# Patient Record
Sex: FEMALE | Race: WHITE | NOT HISPANIC OR LATINO | Employment: FULL TIME | URBAN - METROPOLITAN AREA
[De-identification: names, ages, dates, MRNs, and addresses within clinical notes are randomized per-mention and may not be internally consistent; named-entity substitution may affect disease eponyms.]

---

## 2022-02-07 ENCOUNTER — TRANSFERRED RECORDS (OUTPATIENT)
Dept: HEALTH INFORMATION MANAGEMENT | Facility: CLINIC | Age: 22
End: 2022-02-07

## 2022-03-01 ENCOUNTER — TRANSFERRED RECORDS (OUTPATIENT)
Dept: MULTI SPECIALTY CLINIC | Facility: CLINIC | Age: 22
End: 2022-03-01

## 2022-03-01 LAB — PAP SMEAR - HIM PATIENT REPORTED: NORMAL

## 2023-01-13 ENCOUNTER — TRANSFERRED RECORDS (OUTPATIENT)
Dept: HEALTH INFORMATION MANAGEMENT | Facility: CLINIC | Age: 23
End: 2023-01-13

## 2023-08-06 ENCOUNTER — TRANSFERRED RECORDS (OUTPATIENT)
Dept: HEALTH INFORMATION MANAGEMENT | Facility: HOSPITAL | Age: 23
End: 2023-08-06

## 2023-10-24 ENCOUNTER — TRANSFERRED RECORDS (OUTPATIENT)
Dept: HEALTH INFORMATION MANAGEMENT | Facility: CLINIC | Age: 23
End: 2023-10-24

## 2023-12-19 LAB — PHQ9 SCORE: 22

## 2024-03-27 ENCOUNTER — TRANSFERRED RECORDS (OUTPATIENT)
Dept: HEALTH INFORMATION MANAGEMENT | Facility: CLINIC | Age: 24
End: 2024-03-27

## 2024-05-20 LAB
CREATININE (EXTERNAL): 0.82 MG/DL (ref 0.4–1)
GFR ESTIMATED (EXTERNAL): 103 ML/MIN/1.73M2
GLUCOSE (EXTERNAL): 86 MG/DL (ref 70–99)
POTASSIUM (EXTERNAL): 4.3 MEQ/L (ref 3.4–5.1)
TSH SERPL-ACNC: 3.65 UIU/ML (ref 0.4–3.99)

## 2024-05-24 ENCOUNTER — TRANSFERRED RECORDS (OUTPATIENT)
Dept: HEALTH INFORMATION MANAGEMENT | Facility: HOSPITAL | Age: 24
End: 2024-05-24

## 2024-05-30 LAB — PHQ9 SCORE: 14

## 2024-06-01 ENCOUNTER — TRANSFERRED RECORDS (OUTPATIENT)
Dept: MULTI SPECIALTY CLINIC | Facility: CLINIC | Age: 24
End: 2024-06-01

## 2024-06-01 LAB — CHLAMYDIA - HIM PATIENT REPORTED: NORMAL

## 2024-06-21 ENCOUNTER — TRANSFERRED RECORDS (OUTPATIENT)
Dept: HEALTH INFORMATION MANAGEMENT | Facility: CLINIC | Age: 24
End: 2024-06-21

## 2024-07-24 ENCOUNTER — TRANSFERRED RECORDS (OUTPATIENT)
Dept: HEALTH INFORMATION MANAGEMENT | Facility: HOSPITAL | Age: 24
End: 2024-07-24

## 2024-07-30 ENCOUNTER — TRANSFERRED RECORDS (OUTPATIENT)
Dept: HEALTH INFORMATION MANAGEMENT | Facility: HOSPITAL | Age: 24
End: 2024-07-30

## 2024-07-30 LAB
ALT SERPL-CCNC: 12 IU/L (ref 6–31)
AST SERPL-CCNC: 17 IU/L (ref 10–40)
CREATININE (EXTERNAL): 0.85 MG/DL (ref 0.4–1)
GFR ESTIMATED (EXTERNAL): 99 ML/MIN/1.73M2
GLUCOSE (EXTERNAL): 66 MG/DL (ref 70–99)
INR (EXTERNAL): 1 (ref 0.9–1.1)
POTASSIUM (EXTERNAL): 4.2 MEQ/L (ref 3.4–5.1)
TSH SERPL-ACNC: 1.29 UIU/ML (ref 0.4–3.99)

## 2024-08-07 ENCOUNTER — TRANSFERRED RECORDS (OUTPATIENT)
Dept: HEALTH INFORMATION MANAGEMENT | Facility: CLINIC | Age: 24
End: 2024-08-07

## 2024-08-07 LAB
CREATININE (EXTERNAL): 0.81 MG/DL (ref 0.4–1)
GFR ESTIMATED (EXTERNAL): 105 ML/MIN/1.73M2
GLUCOSE (EXTERNAL): 84 MG/DL (ref 70–99)
POTASSIUM (EXTERNAL): 3.6 MEQ/L (ref 3.4–5.1)

## 2024-08-13 ENCOUNTER — APPOINTMENT (OUTPATIENT)
Dept: OCCUPATIONAL MEDICINE | Facility: OTHER | Age: 24
End: 2024-08-13

## 2024-08-13 ENCOUNTER — OFFICE VISIT (OUTPATIENT)
Dept: FAMILY MEDICINE | Facility: OTHER | Age: 24
End: 2024-08-13
Attending: FAMILY MEDICINE
Payer: COMMERCIAL

## 2024-08-13 VITALS
HEIGHT: 64 IN | OXYGEN SATURATION: 99 % | RESPIRATION RATE: 17 BRPM | DIASTOLIC BLOOD PRESSURE: 70 MMHG | SYSTOLIC BLOOD PRESSURE: 110 MMHG | TEMPERATURE: 98.1 F | BODY MASS INDEX: 26.65 KG/M2 | HEART RATE: 78 BPM | WEIGHT: 156.1 LBS

## 2024-08-13 DIAGNOSIS — R42 DIZZINESS: ICD-10-CM

## 2024-08-13 DIAGNOSIS — G43.909 MIGRAINE WITHOUT STATUS MIGRAINOSUS, NOT INTRACTABLE, UNSPECIFIED MIGRAINE TYPE: Primary | ICD-10-CM

## 2024-08-13 DIAGNOSIS — R20.2 PARESTHESIA: ICD-10-CM

## 2024-08-13 DIAGNOSIS — E16.2 HYPOGLYCEMIA: ICD-10-CM

## 2024-08-13 DIAGNOSIS — F31.81 BIPOLAR II DISORDER, MOST RECENT EPISODE HYPOMANIC (H): ICD-10-CM

## 2024-08-13 PROBLEM — F43.10 PTSD (POST-TRAUMATIC STRESS DISORDER): Status: ACTIVE | Noted: 2023-10-25

## 2024-08-13 PROCEDURE — 99214 OFFICE O/P EST MOD 30 MIN: CPT | Performed by: FAMILY MEDICINE

## 2024-08-13 PROCEDURE — 99000 SPECIMEN HANDLING OFFICE-LAB: CPT

## 2024-08-13 PROCEDURE — G0463 HOSPITAL OUTPT CLINIC VISIT: HCPCS

## 2024-08-13 PROCEDURE — G2211 COMPLEX E/M VISIT ADD ON: HCPCS | Performed by: FAMILY MEDICINE

## 2024-08-13 RX ORDER — LITHIUM CARBONATE 300 MG/1
300 TABLET, FILM COATED, EXTENDED RELEASE ORAL 2 TIMES DAILY
Qty: 180 TABLET | Refills: 3 | Status: SHIPPED | OUTPATIENT
Start: 2024-08-13 | End: 2024-10-02

## 2024-08-13 RX ORDER — FLUTICASONE PROPIONATE 50 MCG
2 SPRAY, SUSPENSION (ML) NASAL DAILY
COMMUNITY

## 2024-08-13 RX ORDER — LITHIUM CARBONATE 300 MG/1
1 TABLET, FILM COATED, EXTENDED RELEASE ORAL 2 TIMES DAILY
COMMUNITY
Start: 2024-06-24 | End: 2024-08-13

## 2024-08-13 RX ORDER — LORATADINE 10 MG/1
10 TABLET ORAL DAILY PRN
COMMUNITY

## 2024-08-13 ASSESSMENT — PAIN SCALES - GENERAL: PAINLEVEL: MODERATE PAIN (4)

## 2024-08-13 NOTE — PROGRESS NOTES
"  Assessment & Plan     Migraine without status migrainosus, not intractable, unspecified migraine type  Not currently on migraine specific medications. ? If the sx she experiences below may be related to migraine disorder. Consider neuro consult    Bipolar II disorder, most recent episode hypomanic (H)  Pt is seen by outside psychiatry group. On low dose lithium. This has been helpful. Will get records.   - lithium ER (LITHOBID) 300 MG CR tablet  Dispense: 180 tablet; Refill: 3  - Lithium level    Paresthesia / Dizziness  Facial numbness, brain fog, etc. Ongoing for quite some time. Pt ties these with low BG levels, however, these sx occur even with fairly normal levels. Update labs below. She reports previous imaging and will bring records.   - TSH with free T4 reflex  - Vitamin B12  - Magnesium  - Anti Nuclear Seema IgG by IFA with Reflex    Hypoglycemia  Reviewed BG readings on pts phone. Lowest in high 60s. Symptoms, however, can occur with BG in the low 100s, etc. While the BG run on the low end, I am not sure the degree of symptoms is explained by her BG levels. These levels appear generally wnl for 24 yo patient. Recommend further work up for the symptoms as above. Will discuss diet further, increase in protein, complex carbs.   - Comprehensive metabolic panel (BMP + Alb, Alk Phos, ALT, AST, Total. Bili, TP)  - Cortisol  - CRP, inflammation  - ESR: Erythrocyte sedimentation rate    BMI  Estimated body mass index is 26.79 kg/m  as calculated from the following:    Height as of this encounter: 1.626 m (5' 4\").    Weight as of this encounter: 70.8 kg (156 lb 1.6 oz).     No follow-ups on file.    May Vences is a 23 year old, presenting for the following health issues:  Follow Up (Symptoms of low blood sugars )        8/13/2024     1:24 PM   Additional Questions   Roomed by Penny Tobin   Accompanied by self         8/13/2024     1:24 PM   Patient Reported Additional Medications   Patient reports taking " the following new medications none       Via the Health Maintenance questionnaire, the patient has reported the following services have been completed , this information has been sent to the abstraction team.    The longitudinal plan of care for the diagnosis(es)/condition(s) as documented were addressed during this visit. Due to the added complexity in care, I will continue to support Vangie in the subsequent management and with ongoing continuity of care.    History of Present Illness       Reason for visit:  New Patient, reccurent symptoms    She eats 0-1 servings of fruits and vegetables daily.She consumes 2 sweetened beverage(s) daily.She exercises with enough effort to increase her heart rate 30 to 60 minutes per day.  She exercises with enough effort to increase her heart rate 4 days per week.   She is taking medications regularly.       Concern - history of hypoglycemia   Onset: since 15 years old, was ok for while and is now having symptoms again   Description: symptoms of low BS, monitoring blood glucose numbers for past 3 weeks (started Aug. 5) lowest 62 highest 125   Intensity: moderate, frustrated because having symptoms of having low BG (feels dizzy and faint especially at work)  Progression of Symptoms:  intermittent  Accompanying Signs & Symptoms: trouble keeping BG regulated, has had heart palpitations, has trouble in warm temperatures, at times feels like passing out       Previous history of similar problem: off and on since teen   Precipitating factors:        Worsened by: working in warm weather and not eating, exercise  Alleviating factors:        Improved by: eating the right foods at the right time and BG is good through the night, starts to tank during the day   Therapies tried and outcome: monitoring blood sugars.     Shaky with < 75    Review of Systems  Constitutional, neuro, ENT, endocrine, pulmonary, cardiac, gastrointestinal, genitourinary, musculoskeletal, integument and psychiatric  "systems are negative, except as otherwise noted.      Objective    /70 (BP Location: Left arm, Patient Position: Sitting, Cuff Size: Adult Regular)   Pulse 78   Temp 98.1  F (36.7  C) (Tympanic)   Resp 17   Ht 1.626 m (5' 4\")   Wt 70.8 kg (156 lb 1.6 oz)   LMP 07/26/2024 (Exact Date)   SpO2 99%   BMI 26.79 kg/m    Body mass index is 26.79 kg/m .    Physical Exam   GENERAL: alert and no distress  EYES: Eyes grossly normal to inspection  NECK: no adenopathy, no asymmetry, masses, or scars  RESP: lungs clear to auscultation - no rales, rhonchi or wheezes  CV: regular rate and rhythm, normal S1 S2, no S3 or S4, no murmur, click or rub, no peripheral edema  ABDOMEN: soft, nontender, no hepatosplenomegaly, no masses and bowel sounds normal  MS: no gross musculoskeletal defects noted, no edema  SKIN: no suspicious lesions or rashes  PSYCH: mentation appears normal, affect normal/bright    No results found for any visits on 08/13/24.      Signed Electronically by: Silva Self MD    "

## 2024-08-13 NOTE — LETTER
8/13/2024          To whom it may concern,       Vangie Granados was seen 8/13/2024 for ongoing physical symptoms that are currently undergoing work up. While there are no recommendations for work restrictions, she may require intermittent absence or break due to symptoms.         Silva Self MD

## 2024-08-14 ENCOUNTER — LAB (OUTPATIENT)
Dept: LAB | Facility: OTHER | Age: 24
End: 2024-08-14
Payer: COMMERCIAL

## 2024-08-14 ENCOUNTER — MYC MEDICAL ADVICE (OUTPATIENT)
Dept: FAMILY MEDICINE | Facility: OTHER | Age: 24
End: 2024-08-14

## 2024-08-14 DIAGNOSIS — F31.81 BIPOLAR II DISORDER, MOST RECENT EPISODE HYPOMANIC (H): ICD-10-CM

## 2024-08-14 DIAGNOSIS — E16.2 HYPOGLYCEMIA: ICD-10-CM

## 2024-08-14 DIAGNOSIS — R42 DIZZINESS: ICD-10-CM

## 2024-08-14 DIAGNOSIS — R20.2 PARESTHESIA: ICD-10-CM

## 2024-08-14 LAB
ALBUMIN SERPL BCG-MCNC: 4 G/DL (ref 3.5–5.2)
ALP SERPL-CCNC: 29 U/L (ref 40–150)
ALT SERPL W P-5'-P-CCNC: 17 U/L (ref 0–50)
ANION GAP SERPL CALCULATED.3IONS-SCNC: 6 MMOL/L (ref 7–15)
AST SERPL W P-5'-P-CCNC: 17 U/L (ref 0–45)
BILIRUB SERPL-MCNC: 0.4 MG/DL
BUN SERPL-MCNC: 10.6 MG/DL (ref 6–20)
CALCIUM SERPL-MCNC: 8.8 MG/DL (ref 8.8–10.4)
CHLORIDE SERPL-SCNC: 107 MMOL/L (ref 98–107)
CORTIS SERPL-MCNC: 33.2 UG/DL
CREAT SERPL-MCNC: 0.82 MG/DL (ref 0.51–0.95)
CRP SERPL-MCNC: 3.62 MG/L
EGFRCR SERPLBLD CKD-EPI 2021: >90 ML/MIN/1.73M2
ERYTHROCYTE [SEDIMENTATION RATE] IN BLOOD BY WESTERGREN METHOD: 6 MM/HR (ref 0–20)
GLUCOSE SERPL-MCNC: 94 MG/DL (ref 70–99)
HCO3 SERPL-SCNC: 25 MMOL/L (ref 22–29)
LITHIUM SERPL-SCNC: 0.4 MMOL/L (ref 0.6–1.2)
MAGNESIUM SERPL-MCNC: 2.1 MG/DL (ref 1.7–2.3)
POTASSIUM SERPL-SCNC: 3.9 MMOL/L (ref 3.4–5.3)
PROT SERPL-MCNC: 6.6 G/DL (ref 6.4–8.3)
SODIUM SERPL-SCNC: 138 MMOL/L (ref 135–145)
TSH SERPL DL<=0.005 MIU/L-ACNC: 4.01 UIU/ML (ref 0.3–4.2)
VIT B12 SERPL-MCNC: 268 PG/ML (ref 232–1245)

## 2024-08-14 PROCEDURE — 86140 C-REACTIVE PROTEIN: CPT | Mod: ZL

## 2024-08-14 PROCEDURE — 85652 RBC SED RATE AUTOMATED: CPT | Mod: ZL

## 2024-08-14 PROCEDURE — 83735 ASSAY OF MAGNESIUM: CPT | Mod: ZL

## 2024-08-14 PROCEDURE — 80178 ASSAY OF LITHIUM: CPT | Mod: ZL

## 2024-08-14 PROCEDURE — 82533 TOTAL CORTISOL: CPT | Mod: ZL

## 2024-08-14 PROCEDURE — 86038 ANTINUCLEAR ANTIBODIES: CPT | Mod: ZL

## 2024-08-14 PROCEDURE — 36415 COLL VENOUS BLD VENIPUNCTURE: CPT | Mod: ZL

## 2024-08-14 PROCEDURE — 80053 COMPREHEN METABOLIC PANEL: CPT | Mod: ZL

## 2024-08-14 PROCEDURE — 82607 VITAMIN B-12: CPT | Mod: ZL

## 2024-08-14 PROCEDURE — 84443 ASSAY THYROID STIM HORMONE: CPT | Mod: ZL

## 2024-08-15 ENCOUNTER — MYC MEDICAL ADVICE (OUTPATIENT)
Dept: FAMILY MEDICINE | Facility: OTHER | Age: 24
End: 2024-08-15

## 2024-08-15 LAB — ANA SER QL IF: NEGATIVE

## 2024-08-16 NOTE — TELEPHONE ENCOUNTER
"8/16/2024 2:13 PM    Pt called back writer spoke with pt. See message below.     Pt advised if needing immediate medical attention, call 911, go to the ER immediately. Pt denies needing immediate medical attention.    Pt reports hx of migraines and body numbness and head pressure. Again, pt reports all sx are on going. Pt requesting to be seen next week. Pt scheduled.     Pt stated \"I was told I was going to have a Head CT\".     Anamaria Alvarez RN    Future Appointments 8/16/2024 - 2/12/2025        Date Visit Type Length Department Provider     8/19/2024 10:30 AM OFFICE VISIT 30 min [facility] Izabel Dumont, APRN CNP    Location Instructions:     From [facility]: Take [address]. Turn left at [address] [facility] [address] [facility]. Turn left at the first stoplight on [address]. At the first stop sign, take a right onto [address]. The upper level parking lot will be on the left. East Entrance Door number 10.   From [place]: Take [address]. Take a right at [address]. At the first stop sign, take a right onto [address]. The upper level parking lot will be on the left. East Entrance Door number 10.   From [place]: Take [address]. Take the [address] ramp towards [place]. Turn left onto [address]. Take a slight right onto [address]. Turn left at the first stoplight on [address]. At the first stop sign, take a right onto [facility]. The upper level parking lot will be on the left. East Entrance Door number 10.              8/20/2024 10:20 AM NEW 20 min PZ Jose Olmedo DC    Location Instructions:     From [facility]: Take [address]. Turn left at [address] [facility] [address]. Turn left at the second stoplight on [address]. . Go past [employer] and take a left into the parking lot and the facility will be on your right.   From [place]: Take [address]. Take the [address] ramp towards [place]. Turn left " onto [address]. Take a slight right onto [address] [facility]. Turn left at the second stoplight on [address]. Go Past [employer] and take a left into the partking lot and the facility will be on your right.&nbsp;   From [place]: Take [address]. Take a right at [address].&nbsp; Go past [employer] and take a left into the parking lot.&nbsp; The facility will be on your right.

## 2024-08-16 NOTE — TELEPHONE ENCOUNTER
8/16/2024 1:21 PM  Second attempt made to contact pt. Pt didn't answer her phone. See my chart message.  Anamaria Alvarez RN     Pt BIBA c/o dizziness, intermittent x 2 weeks.  States she was treated in the hospital 2 weeks ago and told she had PEs.  Placed on blood thinners.  States she has a hx of vertigo but it has been a long time since she had an episode.  Denies cp.  No weakness, loss of sensation noted.

## 2024-08-16 NOTE — TELEPHONE ENCOUNTER
I dont believe a CT of the head was discussed in the plan.     I know were looking to get her previous records and imaging.     If sx are better can discuss at appt next week.

## 2024-08-16 NOTE — TELEPHONE ENCOUNTER
11:47 AM    Reason for Call: Phone Call    Description: Patient reporting numbness over entire body and experiencing migraine symptoms on left side of her head, when she is active. She had to leave work today before her migraine got much worse. Please give her a call back to advise.    Was an appointment offered for this call? No  If yes : Appointment type              Date    Preferred method for responding to this message: Telephone Call  What is your phone number ?  [phone]    If we cannot reach you directly, may we leave a detailed response at the number you provided? Yes    Can this message wait until your PCP/provider returns, if available today? Not applicable    Jamila Oliveros

## 2024-08-19 ENCOUNTER — OFFICE VISIT (OUTPATIENT)
Dept: FAMILY MEDICINE | Facility: OTHER | Age: 24
End: 2024-08-19
Payer: COMMERCIAL

## 2024-08-19 VITALS
WEIGHT: 156 LBS | HEIGHT: 64 IN | DIASTOLIC BLOOD PRESSURE: 68 MMHG | OXYGEN SATURATION: 99 % | TEMPERATURE: 97.8 F | HEART RATE: 64 BPM | SYSTOLIC BLOOD PRESSURE: 114 MMHG | RESPIRATION RATE: 16 BRPM | BODY MASS INDEX: 26.63 KG/M2

## 2024-08-19 DIAGNOSIS — R20.2 PARESTHESIA: ICD-10-CM

## 2024-08-19 DIAGNOSIS — F41.9 ANXIETY: ICD-10-CM

## 2024-08-19 DIAGNOSIS — E53.8 VITAMIN B12 DEFICIENCY (NON ANEMIC): ICD-10-CM

## 2024-08-19 DIAGNOSIS — F31.81 BIPOLAR II DISORDER, MOST RECENT EPISODE HYPOMANIC (H): ICD-10-CM

## 2024-08-19 DIAGNOSIS — G43.909 MIGRAINE WITHOUT STATUS MIGRAINOSUS, NOT INTRACTABLE, UNSPECIFIED MIGRAINE TYPE: Primary | ICD-10-CM

## 2024-08-19 PROCEDURE — G0463 HOSPITAL OUTPT CLINIC VISIT: HCPCS

## 2024-08-19 PROCEDURE — 99214 OFFICE O/P EST MOD 30 MIN: CPT

## 2024-08-19 RX ORDER — LANOLIN ALCOHOL/MO/W.PET/CERES
1000 CREAM (GRAM) TOPICAL DAILY
Qty: 90 TABLET | Refills: 0 | Status: SHIPPED | OUTPATIENT
Start: 2024-08-19

## 2024-08-19 ASSESSMENT — PAIN SCALES - GENERAL: PAINLEVEL: NO PAIN (0)

## 2024-08-19 NOTE — LETTER
August 19, 2024      Vangie Granados  [address]  [place] [address]        To Whom It May Concern:    Vangie Granados  was seen on 8/19.  Please excuse her from work on this date due to scheduled office visit and migraine management.         Sincerely,        DEANDRE Wang CNP

## 2024-08-19 NOTE — PROGRESS NOTES
Assessment & Plan     Migraine without status migrainosus, not intractable, unspecified migraine type  Chronic, now with 10-12 headaches per month. Managing with otc analgesics, tylenol. She does not feel paresthesias overlap with her migraines. See below related to updated head imaging. Not interested in further abortive medications. Consider neurology referral.     Paresthesia/Vitamin B12 deficiency (non anemic)  Patient's main concern today of ongoing paresthesia, history is rather complicated. See mychart messages from 8/14. Present over the past 2 months, at times unilateral however currently noted as bilateral including her head/face/shoulders. Does not appear to coincide with her migraines. Underwent extensive laboratory evaluation at [facility] care appointment with Dr. Self on 8/13. We reviewed this blood work at length today. B12 level is on the low end of normal at 268, recommend starting daily supplement 1000 mcg as this could contribute to her symptoms. Patient also mentions request for head imaging and concern with family history of brain aneurysm (states a brain MRI was obtained at [facility] in [place] in 2022 with a small abnormality that CT follow-up was requested which she has not had done). Do not feel based on the length of her symptoms that acute/urgent imaging is needed. Benign neuro exam in the clinic today. She has no headache, nausea, vomiting, weakness today. Vital signs are stable. We will continue to work on requesting records of her prior imaging and plan to repeat- Ct vs MRI. Will be in touch with patient this week. Can consider updated CBC, obtaining SPEP. See below related to psychiatry referral. Of note, work note requested today and given. No further work restriction given. She has previously been given documentation to rest as needed while on the job related to her current symptoms.   - cyanocobalamin (VITAMIN B-12) 1000 MCG tablet  Dispense: 90 tablet; Refill:  0    Bipolar II disorder, most recent episode hypomanic (H)/Anxiety  Unclear history. Currently on lithium which she has been self titrating. Level obtained this week and low at 0.40. Discussed establishing with psychiatry for ongoing evaluation and management. Referral placed today.  - Adult Mental Health  Referral      No follow-ups on file.    May Vences is a 23 year old, presenting for the following health issues:  Headache        8/19/2024    10:24 AM   Additional Questions   Roomed by Tamela Griffiths       Via the Health Maintenance questionnaire, the patient has reported the following services have been completed -Chlamydia: [facility] 2024-06-01, this information has been sent to the abstraction team.  History of Present Illness       Reason for visit:  New Patient, reccurent symptoms    She eats 0-1 servings of fruits and vegetables daily.She consumes 2 sweetened beverage(s) daily.She exercises with enough effort to increase her heart rate 30 to 60 minutes per day.  She exercises with enough effort to increase her heart rate 4 days per week.   She is taking medications regularly.       Migraine   Since your last clinic visit, how have your headaches changed?  Worsened  How often are you getting headaches or migraines? 10 to 12 a month   Are you able to do normal daily activities when you have a migraine? No  Are you taking rescue/relief medications? (Select all that apply) Tylenol  How helpful is your rescue/relief medication?  The relief is inconsistent  Are you taking any medications to prevent migraines? (Select all that apply)  No  In the past 4 weeks, how often have you gone to urgent care or the emergency room because of your headaches?  1    - Brain fog, numbness, tingling, 2 months straight  - Notes numbness- all of her face/head/neck. Used to be only on left side, now both sides.   - Notes she sometimes has bad days where notes numbness across her whole body  -  "Notes she previously discussed a CT with PCP  - MRI brain about 2 years ago, possible abnormality with follow-up CT, no recommendation on length  - Mother's side of family, brain aneurysm  - History of migraines. Tragus piercing helpful. Missing work 3-4 days a week for a number of years prior. Improved after piercing.   - Notes numbness entire body for whole day. Lying down seems to help. Day following this notes a severe headache (3 days ago, 8/15).  - Took a tylenol 500 mg - helped partially.  Tried to sleep off, stretching with no relief.  The following day it was resolved.  - No current nausea or vomiting. Did have nausea with headache 4 days ago.  - Taking lithium, dose decreased due to brain fog.  - 3-4 out of 10 severity of migraines. Prior to piercing they were much worse.  - Requesting work excuse for today  - also inquiring on recommendation for work restriction moving forward.  - Has note that ok to rest at work for her symptoms.    Review of Systems  Constitutional, neuro, ENT, endocrine, pulmonary, cardiac, gastrointestinal, genitourinary, musculoskeletal, integument and psychiatric systems are negative, except as otherwise noted.      Objective    /68 (BP Location: Left arm, Patient Position: Sitting, Cuff Size: Adult Regular)   Pulse 64   Temp 97.8  F (36.6  C) (Tympanic)   Resp 16   Ht 1.626 m (5' 4\")   Wt 70.8 kg (156 lb)   LMP 07/26/2024 (Exact Date)   SpO2 99%   BMI 26.78 kg/m    Body mass index is 26.78 kg/m .    Physical Exam  Constitutional:       General: She is not in acute distress.     Appearance: She is not ill-appearing.   HENT:      Head: Normocephalic and atraumatic.      Right Ear: Tympanic membrane and ear canal normal.      Left Ear: Tympanic membrane and ear canal normal.      Nose: Nose normal.   Eyes:      Extraocular Movements: Extraocular movements intact.      Pupils: Pupils are equal, round, and reactive to light.   Cardiovascular:      Rate and Rhythm: Normal " rate and regular rhythm.      Pulses: Normal pulses.   Pulmonary:      Effort: Pulmonary effort is normal. No respiratory distress.      Breath sounds: No wheezing, rhonchi or rales.   Skin:     General: Skin is warm and dry.   Neurological:      Mental Status: She is alert.      GCS: GCS eye subscore is 4. GCS verbal subscore is 5. GCS motor subscore is 6.      Cranial Nerves: Cranial nerves 2-12 are intact. No dysarthria or facial asymmetry.      Sensory: Sensation is intact.      Motor: Motor function is intact. No weakness.      Coordination: Coordination is intact. Romberg sign negative. Finger-Nose-Finger Test normal.      Gait: Gait is intact. Gait normal.   Psychiatric:         Attention and Perception: Attention normal.         Mood and Affect: Mood is anxious.         Speech: Speech normal.         Behavior: Behavior is cooperative.         Thought Content: Thought content normal.                Signed Electronically by: DEANDRE Wang CNP

## 2024-08-20 ENCOUNTER — OFFICE VISIT (OUTPATIENT)
Dept: CHIROPRACTIC MEDICINE | Facility: OTHER | Age: 24
End: 2024-08-20
Attending: CHIROPRACTOR
Payer: COMMERCIAL

## 2024-08-20 DIAGNOSIS — M99.01 SEGMENTAL AND SOMATIC DYSFUNCTION OF CERVICAL REGION: Primary | ICD-10-CM

## 2024-08-20 DIAGNOSIS — M99.03 SEGMENTAL AND SOMATIC DYSFUNCTION OF LUMBAR REGION: ICD-10-CM

## 2024-08-20 DIAGNOSIS — M54.2 CERVICALGIA: ICD-10-CM

## 2024-08-20 DIAGNOSIS — M99.02 SEGMENTAL AND SOMATIC DYSFUNCTION OF THORACIC REGION: ICD-10-CM

## 2024-08-20 PROCEDURE — 98941 CHIROPRACT MANJ 3-4 REGIONS: CPT | Mod: AT | Performed by: CHIROPRACTOR

## 2024-08-20 PROCEDURE — 99202 OFFICE O/P NEW SF 15 MIN: CPT | Mod: 25 | Performed by: CHIROPRACTOR

## 2024-08-22 ENCOUNTER — OFFICE VISIT (OUTPATIENT)
Dept: CHIROPRACTIC MEDICINE | Facility: OTHER | Age: 24
End: 2024-08-22
Attending: CHIROPRACTOR
Payer: COMMERCIAL

## 2024-08-22 DIAGNOSIS — M99.03 SEGMENTAL AND SOMATIC DYSFUNCTION OF LUMBAR REGION: Primary | ICD-10-CM

## 2024-08-22 DIAGNOSIS — M99.02 SEGMENTAL AND SOMATIC DYSFUNCTION OF THORACIC REGION: ICD-10-CM

## 2024-08-22 DIAGNOSIS — M99.01 SEGMENTAL AND SOMATIC DYSFUNCTION OF CERVICAL REGION: ICD-10-CM

## 2024-08-22 DIAGNOSIS — M54.50 ACUTE BILATERAL LOW BACK PAIN WITHOUT SCIATICA: ICD-10-CM

## 2024-08-22 PROCEDURE — 98941 CHIROPRACT MANJ 3-4 REGIONS: CPT | Mod: AT | Performed by: CHIROPRACTOR

## 2024-08-27 NOTE — PROGRESS NOTES
Subjective Finding:    Chief compalint: Patient presents with:  Back Pain: With neck pain   , Pain Scale: 5/10, Intensity: sharp, Duration: 1 months, Radiating: bilateral buttock.    Date of injury:     Activities that the pain restricts:   Home/household/hobbies/social activities: Yes.  Work duties: Yes.  Sleep: Yes.  Makes symptoms better: rest.  Makes symptoms worse: activity, cervical extension, and cervical flexion.  Have you seen anyone else for the symptoms? Yes: MD.  Work related: No.  Automobile related injury: No.    Objective and Assessment:    Posture Analysis:   High shoulder: .  Head tilt: .  High iliac crest: .  Head carriage: forward.  Thoracic Kyphosis: neutral.  Lumbar Lordosis: neutral.    Lumbar Range of Motion: extension decreased.  Cervical Range of Motion: left rotation decreased and right rotation decreased.  Thoracic Range of Motion: .  Extremity Range of Motion: .    Palpation:   Traps: sharp pain, referred pain: no    Segmental dysfunction pre-treatment and treatment area: C2, C3, T4, and L5.    Assessment post-treatment:  Cervical: ROM increased.  Thoracic: ROM increased.  Lumbar: ROM increased.    Comments: .      Complicating Factors: .    Procedure(s):  CMT:  34489 Chiropractic manipulative treatment 3-4 regions performed   Cervical: Diversified, See above for level, Supine, Thoracic: Diversified, See above for level, Prone, and Lumbar: Diversified, See above for level, Side posture    Modalities:  None performed this visit    Therapeutic procedures:  None    Plan:  Treatment plan: 2 times 2 weeks  .  Instructed patient: stretch as instructed at visit.  Short term goals: increase ROM.  Long term goals: increase ADL.  Prognosis: excellent.

## 2024-08-29 NOTE — PROGRESS NOTES
Subjective Finding:    Chief compalint: Patient presents with:  Back Pain , Pain Scale: 3/10, Intensity: dull, Duration: 2 weeks, Radiating: no.    Date of injury:     Activities that the pain restricts:   Home/household/hobbies/social activities: Yes.  Work duties: No.  Sleep: No.  Makes symptoms better: rest.  Makes symptoms worse: lumbar flexion and cervical flexion.  Have you seen anyone else for the symptoms? No.  Work related: No.  Automobile related injury: No.    Objective and Assessment:    Posture Analysis:   High shoulder: .  Head tilt: .  High iliac crest: .  Head carriage: neutral.  Thoracic Kyphosis: neutral.  Lumbar Lordosis: neutral.    Lumbar Range of Motion: extension decreased.  Cervical Range of Motion: left rotation decreased and right rotation decreased.  Thoracic Range of Motion: .  Extremity Range of Motion: .    Palpation:   Quad lumb: bilateral, referred pain: no    Segmental dysfunction pre-treatment and treatment area: C2, C4, T3, and L4.    Assessment post-treatment:  Cervical: ROM increased.  Thoracic: ROM increased.  Lumbar: ROM increased.    Comments: .      Complicating Factors: .    Procedure(s):  CMT:  93769 Chiropractic manipulative treatment 3-4 regions performed   Cervical: Diversified, See above for level, Supine, Thoracic: Diversified, See above for level, Prone, and Lumbar: Diversified, See above for level, Side posture    Modalities:  None performed this visit    Therapeutic procedures:  None    Plan:  Treatment plan: PRN.  Instructed patient: stretch as instructed at visit.  Short term goals: increase ROM.  Long term goals: increase ADL.  Prognosis: very good.

## 2024-09-13 ENCOUNTER — MYC MEDICAL ADVICE (OUTPATIENT)
Dept: FAMILY MEDICINE | Facility: OTHER | Age: 24
End: 2024-09-13

## 2024-09-13 NOTE — LETTER
September 16, 2024      Vangie Granados  [address]  [place] [address]        To Whom It May Concern:    Vangie Granados has been seen for complex medical concerns. Her symptoms are ongoing and have precluded her participation in her classes. Until her symptoms are improved and proper maintenance program is found, she will be unable to attend class on a reliable basis. Please contact our office with specific concerns or questions.             Sincerely,        Silva Self MD

## 2024-09-20 ENCOUNTER — OFFICE VISIT (OUTPATIENT)
Dept: OBGYN | Facility: OTHER | Age: 24
End: 2024-09-20
Attending: OBSTETRICS & GYNECOLOGY
Payer: COMMERCIAL

## 2024-09-20 VITALS — HEART RATE: 72 BPM | WEIGHT: 156 LBS | HEIGHT: 64 IN | BODY MASS INDEX: 26.63 KG/M2 | RESPIRATION RATE: 18 BRPM

## 2024-09-20 DIAGNOSIS — Z30.017 NEXPLANON INSERTION: Primary | ICD-10-CM

## 2024-09-20 PROCEDURE — 11981 INSERTION DRUG DLVR IMPLANT: CPT | Performed by: OBSTETRICS & GYNECOLOGY

## 2024-09-20 PROCEDURE — 250N000011 HC RX IP 250 OP 636: Mod: JZ | Performed by: OBSTETRICS & GYNECOLOGY

## 2024-09-20 PROCEDURE — G0463 HOSPITAL OUTPT CLINIC VISIT: HCPCS | Mod: 25

## 2024-09-20 RX ORDER — LIDOCAINE HYDROCHLORIDE AND EPINEPHRINE 10; 10 MG/ML; UG/ML
3 INJECTION, SOLUTION INFILTRATION; PERINEURAL ONCE
Status: COMPLETED | OUTPATIENT
Start: 2024-09-20 | End: 2024-09-20

## 2024-09-20 RX ADMIN — ETONOGESTREL 68 MG: 68 IMPLANT SUBCUTANEOUS at 14:49

## 2024-09-20 RX ADMIN — LIDOCAINE HYDROCHLORIDE AND EPINEPHRINE 3 ML: 10; 10 INJECTION, SOLUTION INFILTRATION; PERINEURAL at 14:49

## 2024-09-20 NOTE — PROGRESS NOTES
"Clinic Visit      Subjective:   Vangie Granados is a 23 year old G0 who presents for Nexplanon placement. She has tried 3 different IUDs in the past, and had success with Enedelia, but they were hard to place and her experience in general wasn't great. She has a friend who has Nexplanon and, despite irregular bleeding, she likes it. Vangie feels that this single hormone option will be good for her cycle, reliable contraception, and her mental health. She is knowledgeable about this device and ready to move forward.    Patient Active Problem List   Diagnosis    Bipolar II disorder, most recent episode hypomanic (H)    Migraine, unspecified, not intractable, without status migrainosus    PTSD (post-traumatic stress disorder)       No past medical history on file.    No past surgical history on file.    Social History     Tobacco Use    Smoking status: Never    Smokeless tobacco: Never   Substance Use Topics    Alcohol use: Not on file       No family history on file.       Allergies   Allergen Reactions    Sertraline Other (See Comments)     Suicidal ideation       Current Outpatient Medications   Medication Sig Dispense Refill    cyanocobalamin (VITAMIN B-12) 1000 MCG tablet Take 1 tablet (1,000 mcg) by mouth daily 90 tablet 0    fluticasone (FLONASE) 50 MCG/ACT nasal spray Spray 2 sprays in nostril daily      loratadine (CLARITIN) 10 MG tablet Take 10 mg by mouth daily as needed for allergies      norgestrel-ethinyl estradiol (LO/OVRAL) 0.3-30 MG-MCG tablet Take 1 tablet by mouth daily      lithium ER (LITHOBID) 300 MG CR tablet Take 1 tablet (300 mg) by mouth 2 times daily 180 tablet 3         Review Of Systems  See HPI      Objective:  Pulse 72   Resp 18   Ht 1.626 m (5' 4\")   Wt 70.8 kg (156 lb)   LMP 07/26/2024 (Exact Date)   BMI 26.78 kg/m      Exam:  Constitutional: NAD    Nexplanon placement:    The patient was counseled about the benefits and risks of Nexplanon including excellent long term " contraception, possible bleeding abnormalities, recommendation for removal in 5 years (sooner for problems or desired pregnancy). Risks including bleeding, infection, pain, bruising were discussed. Her right arm was positioned above her head and her elbow was bent. The insertion site was identified and marked, approximately 10 cm from the elbow in the groove of the bicep. The region was cleaned x 3 with betadine. 2% lidocaine with epinephrine was injected along the insertion path. The device was inserted in the standard fashion without difficulty and easily palpated following insertion. The incision was covered with gauze and a bandaid and wrapped with gauze. The patient tolerated the procedure well.            Assessment/Plan:    1. Nexplanon insertion  Uncomplicated, well tolerated. Precautions shared including infection, bleeding, nerve damage. She use backup contraception for the next couple of weeks and will follow up as needed.  - NEXPLANON INSERTION      Allison West MD  Obstetrics/Gynecology

## 2024-09-27 NOTE — TELEPHONE ENCOUNTER
Writer spoke with patient , she is having difficulty getting records from [facility]. She will discuss with provider at 10/2 appt.  Tamela Griffiths LPN

## 2024-10-02 ENCOUNTER — OFFICE VISIT (OUTPATIENT)
Dept: CHIROPRACTIC MEDICINE | Facility: OTHER | Age: 24
End: 2024-10-02
Attending: CHIROPRACTOR
Payer: COMMERCIAL

## 2024-10-02 ENCOUNTER — TELEPHONE (OUTPATIENT)
Dept: FAMILY MEDICINE | Facility: OTHER | Age: 24
End: 2024-10-02

## 2024-10-02 ENCOUNTER — OFFICE VISIT (OUTPATIENT)
Dept: FAMILY MEDICINE | Facility: OTHER | Age: 24
End: 2024-10-02
Attending: FAMILY MEDICINE
Payer: COMMERCIAL

## 2024-10-02 ENCOUNTER — PATIENT OUTREACH (OUTPATIENT)
Dept: CARE COORDINATION | Facility: OTHER | Age: 24
End: 2024-10-02

## 2024-10-02 VITALS
SYSTOLIC BLOOD PRESSURE: 111 MMHG | RESPIRATION RATE: 18 BRPM | HEIGHT: 64 IN | TEMPERATURE: 98.3 F | BODY MASS INDEX: 27.88 KG/M2 | HEART RATE: 76 BPM | DIASTOLIC BLOOD PRESSURE: 77 MMHG | WEIGHT: 163.3 LBS | OXYGEN SATURATION: 99 %

## 2024-10-02 DIAGNOSIS — G43.E09 CHRONIC MIGRAINE WITH AURA WITHOUT STATUS MIGRAINOSUS, NOT INTRACTABLE: Primary | ICD-10-CM

## 2024-10-02 DIAGNOSIS — M99.03 SEGMENTAL AND SOMATIC DYSFUNCTION OF LUMBAR REGION: ICD-10-CM

## 2024-10-02 DIAGNOSIS — E53.8 VITAMIN B12 DEFICIENCY (NON ANEMIC): ICD-10-CM

## 2024-10-02 DIAGNOSIS — M99.02 SEGMENTAL AND SOMATIC DYSFUNCTION OF THORACIC REGION: ICD-10-CM

## 2024-10-02 DIAGNOSIS — F31.32 BIPOLAR AFFECTIVE DISORDER, CURRENTLY DEPRESSED, MODERATE (H): ICD-10-CM

## 2024-10-02 DIAGNOSIS — R10.9 ABDOMINAL BLOATING WITH CRAMPS: ICD-10-CM

## 2024-10-02 DIAGNOSIS — I67.1 INTRACRANIAL ANEURYSM: ICD-10-CM

## 2024-10-02 DIAGNOSIS — M54.2 CERVICALGIA: ICD-10-CM

## 2024-10-02 DIAGNOSIS — M99.01 SEGMENTAL AND SOMATIC DYSFUNCTION OF CERVICAL REGION: Primary | ICD-10-CM

## 2024-10-02 DIAGNOSIS — R14.0 ABDOMINAL BLOATING WITH CRAMPS: ICD-10-CM

## 2024-10-02 DIAGNOSIS — E03.9 HYPOTHYROIDISM, UNSPECIFIED TYPE: ICD-10-CM

## 2024-10-02 PROBLEM — Z82.49 FAMILY HISTORY OF ANEURYSM OF BLOOD VESSEL OF BRAIN: Status: ACTIVE | Noted: 2022-10-04

## 2024-10-02 PROBLEM — E55.9 VITAMIN D DEFICIENCY: Status: ACTIVE | Noted: 2022-11-15

## 2024-10-02 LAB
ALBUMIN SERPL BCG-MCNC: 4.5 G/DL (ref 3.5–5.2)
ALP SERPL-CCNC: 38 U/L (ref 40–150)
ALT SERPL W P-5'-P-CCNC: 15 U/L (ref 0–50)
AST SERPL W P-5'-P-CCNC: 17 U/L (ref 0–45)
BILIRUB DIRECT SERPL-MCNC: <0.2 MG/DL (ref 0–0.3)
BILIRUB SERPL-MCNC: 0.5 MG/DL
PROT SERPL-MCNC: 7.4 G/DL (ref 6.4–8.3)

## 2024-10-02 PROCEDURE — G0463 HOSPITAL OUTPT CLINIC VISIT: HCPCS

## 2024-10-02 PROCEDURE — 80076 HEPATIC FUNCTION PANEL: CPT | Mod: ZL | Performed by: FAMILY MEDICINE

## 2024-10-02 PROCEDURE — G2211 COMPLEX E/M VISIT ADD ON: HCPCS | Performed by: FAMILY MEDICINE

## 2024-10-02 PROCEDURE — 82607 VITAMIN B-12: CPT | Mod: ZL | Performed by: FAMILY MEDICINE

## 2024-10-02 PROCEDURE — 36415 COLL VENOUS BLD VENIPUNCTURE: CPT | Mod: ZL | Performed by: FAMILY MEDICINE

## 2024-10-02 PROCEDURE — 98941 CHIROPRACT MANJ 3-4 REGIONS: CPT | Mod: AT | Performed by: CHIROPRACTOR

## 2024-10-02 PROCEDURE — 99215 OFFICE O/P EST HI 40 MIN: CPT | Performed by: FAMILY MEDICINE

## 2024-10-02 ASSESSMENT — PATIENT HEALTH QUESTIONNAIRE - PHQ9
SUM OF ALL RESPONSES TO PHQ QUESTIONS 1-9: 24
SUM OF ALL RESPONSES TO PHQ QUESTIONS 1-9: 24
10. IF YOU CHECKED OFF ANY PROBLEMS, HOW DIFFICULT HAVE THESE PROBLEMS MADE IT FOR YOU TO DO YOUR WORK, TAKE CARE OF THINGS AT HOME, OR GET ALONG WITH OTHER PEOPLE: EXTREMELY DIFFICULT

## 2024-10-02 ASSESSMENT — ANXIETY QUESTIONNAIRES
6. BECOMING EASILY ANNOYED OR IRRITABLE: MORE THAN HALF THE DAYS
8. IF YOU CHECKED OFF ANY PROBLEMS, HOW DIFFICULT HAVE THESE MADE IT FOR YOU TO DO YOUR WORK, TAKE CARE OF THINGS AT HOME, OR GET ALONG WITH OTHER PEOPLE?: EXTREMELY DIFFICULT
1. FEELING NERVOUS, ANXIOUS, OR ON EDGE: MORE THAN HALF THE DAYS
5. BEING SO RESTLESS THAT IT IS HARD TO SIT STILL: MORE THAN HALF THE DAYS
GAD7 TOTAL SCORE: 18
4. TROUBLE RELAXING: NEARLY EVERY DAY
IF YOU CHECKED OFF ANY PROBLEMS ON THIS QUESTIONNAIRE, HOW DIFFICULT HAVE THESE PROBLEMS MADE IT FOR YOU TO DO YOUR WORK, TAKE CARE OF THINGS AT HOME, OR GET ALONG WITH OTHER PEOPLE: EXTREMELY DIFFICULT
2. NOT BEING ABLE TO STOP OR CONTROL WORRYING: NEARLY EVERY DAY
7. FEELING AFRAID AS IF SOMETHING AWFUL MIGHT HAPPEN: NEARLY EVERY DAY
3. WORRYING TOO MUCH ABOUT DIFFERENT THINGS: NEARLY EVERY DAY
7. FEELING AFRAID AS IF SOMETHING AWFUL MIGHT HAPPEN: NEARLY EVERY DAY
GAD7 TOTAL SCORE: 18
GAD7 TOTAL SCORE: 18
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
## 2024-10-02 NOTE — TELEPHONE ENCOUNTER
Retail Pharmacy Prior Authorization Team   Phone: [phone]    PRIOR AUTHORIZATION DENIED    Medication: NURTEC 75 MG PO TBDP  Insurance Company: [employer] [place] - Phone [phone] Fax [phone]  Denial Date: 10/2/2024  Denial Reason(s): MUST TRY/FAIL UBRELVY        Appeal Information: IF THE PROVIDER WOULD LIKE TO APPEAL THIS DECISION PLEASE PROVIDE THE PA TEAM WITH A LETTER OF MEDICAL NECESSITY      Patient Notified: NO

## 2024-10-02 NOTE — PROGRESS NOTES
Assessment & Plan     Chronic migraine with aura without status migrainosus, not intractable  See neuro notes below from 2022. Did have good luck with nurtec in the past. Will prescribe this for abortive treatment. She may also use excedrin for prn use if needed. Suspect she will also need preventative medication. Previous med trials reviewed. Did well on aimovig injections. We can consider adding this at follow up.   - rimegepant (NURTEC) 75 MG ODT tablet  Dispense: 30 tablet; Refill: 0    Vitamin B12 deficiency (non anemic)  Has started oral supplement, recheck for absoption   - Vitamin B12    Hypothyroidism, unspecified type  Stable on recent labs.     Intracranial aneurysm  Was able to obtain MRI results. Pt does have 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 Will update MR for stability. Discussed given the size, I doubt this is contributing to her current migraines.   - MR Brain w/o & w Contrast  - MRA Brain ([facility]) wo Contrast    Bipolar affective disorder, currently depressed, moderate (H) / ADHD  Previous diagnosis. Did fairly well on lithium, but stopped due to side effects she felt it was causing. She is also unclear if she truly has bipolar disorder. Based on previous conversations with her, does sound like there may have been some markus/hypomania, but current mood is depressed. Pt has done some research interested in strattera or qelbree. Unclear if qelbree is covered, but this would be the preference given the serotonin effect and her depression currently. Otherwise consider effexor (migraines as well) or wellbutrin. Does have psychiatry follow up scheduled.     Abdominal bloating with cramps  Associated with food, usually coupled with diarrhea.   - Hepatic panel (Albumin, ALT, AST, Bili, Alk Phos, TP)  - US Abdomen Limited  - Hepatic panel (Albumin, ALT, AST, Bili, Alk Phos, TP)    BMI  Estimated body mass index is 28.03 kg/m  as calculated from the following:     "Height as of this encounter: 1.626 m (5' 4\").    Weight as of this encounter: 74.1 kg (163 lb 4.8 oz).     Return in about 1 month (around 11/2/2024).    May Vences is a 23 year old, presenting for the following health issues:  Anxiety, Headache, and Depression        10/2/2024     8:57 AM   Additional Questions   Roomed by nicolás badillo   Accompanied by friend         10/2/2024     8:57 AM   Patient Reported Additional Medications   Patient reports taking the following new medications none     History of Present Illness       Reason for visit:  Migraines, numbness, stomach issues related. Meds for mental health. Stomach issues check.   She is taking medications regularly.     pt would like to go back on medications-states SNRIs work for her    Depression and Anxiety   How are you doing with your depression since your last visit? Worsened   How are you doing with your anxiety since your last visit?  Worsened   Are you having other symptoms that might be associated with depression or anxiety? Yes:  suicidal thoughts  Have you had a significant life event? Job Concerns and Health Concerns   Do you have any concerns with your use of alcohol or other drugs? No    Social History     Tobacco Use    Smoking status: Never    Smokeless tobacco: Never   Vaping Use    Vaping status: Never Used   Substance Use Topics    Alcohol use: Yes     Comment: Two beers or one mixed liquor drink every other week.    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         10/2/2024     8:53 AM   PHQ   PHQ-9 Total Score 24   Q9: Thoughts of better off dead/self-harm past 2 weeks More than half the days   F/U: Thoughts of suicide or self-harm Yes   F/U: Self harm-plan No   F/U: Self-harm action No   F/U: Safety concerns Yes         10/2/2024     8:54 AM   OSCAR-7 SCORE   Total Score 18 (severe anxiety)   Total Score 18       Migraine   Since your last clinic visit, how have your " headaches changed?  Worsened  How often are you getting headaches or migraines? daily   Are you able to do normal daily activities when you have a migraine? Yes  Are you taking rescue/relief medications? (Select all that apply) Other: asprin  How helpful is your rescue/relief medication?  I get only a small amount of relief  Are you taking any medications to prevent migraines? (Select all that apply)  No  In the past 4 weeks, how often have you gone to urgent care or the emergency room because of your headaches?  0      Concern - Abdominal Pain  Onset: chronic  Description: states is not able to eat some food as they give her extreme abdominal cramping,rich foods, greasy foods  Intensity: severe  Progression of Symptoms:  worsening  Accompanying Signs & Symptoms: seems to corollate with migraines, diarrhea  Previous history of similar problem: yes  Precipitating factors:        Worsened by: rich foods, greasy foods  Alleviating factors:        Improved by: none  Therapies tried and outcome: tums- but does not help      10/04/2022 text/html HPI Notes: Purnima Granados is a ambidextrous 21 Female who is here today for headaches. He/She denies chest pain, SOB, focal weakness, LOS, LOC, speech disturbance, and visual disturbance,since last exam. previously followed by Dr. Mayer who is retiring. He started her on topiramate 7/7/22 which is working but causing side effects of fatigue and paresthesias in face and L side of body. Prior to starting topomax had L paresthesia as well but was diagnosed with B12. She has not had any labs for at least three years and has not had any imaging. Last headache: 2 mos ago Location of headache: behind both eyes. and neck photo/phonophobia: she has these with migraine and does have these without pain once or twice monthly Radiation:from neck to behind eyes Character of headache: begins achy and then sets in as sharp visual disturbance: none with HA. Frequency: since starting less than  one monthly. Prior to topomax 3wkly Duration of headache:a day long at least may go 3-4 days nausea:none Aggravating factors (physical activity/cough/bending over/ stress/ sleep/weather/foods/etoh): sunlight, loud noises, dehydration Relieving factors: physical activity , massage and pressure to neck. she has knot on L side that really helps when pressed. sleep: getting plenty of hours but has been exhausting last few weeks. depression/anxiety: mild anxiety current treatments:topomax 50mg HS. could not tolerate during day. failed treatments: sumatriptan, topiramate RR for severe side effects but is efficacious for migraine imaging hx:none Norman Pineda MD  [address], [place], [address], [facility] 10/04/2022 10:54:37   11/07/2022 text/html HPI Notes: Purnima Granados is a ambidextrous 21 Female who is here today for headaches. He/She denies chest pain, SOB, focal weakness, LOS, LOC, speech disturbance, and visual disturbance,since last exam. previously followed by Dr. Mayer who is retiring. He started her on topiramate 7/7/22 which is working but causing side effects of fatigue and paresthesias in face and L side of body. Prior to starting topomax had L paresthesia as well but was diagnosed with B12. She has not had any labs for at least three years and has not had any imaging. Last headache: 2 mos ago Location of headache: behind both eyes. and neck photo/phonophobia: she has these with migraine and does have these without pain once or twice monthly Radiation:from neck to behind eyes Character of headache: begins achy and then sets in as sharp visual disturbance: none with HA. Frequency: since starting less than one monthly. Prior to topomax 3wkly Duration of headache:a day long at least may go 3-4 days nausea:none Aggravating factors (physical activity/cough/bending over/ stress/ sleep/weather/foods/etoh): sunlight, loud noises, dehydration Relieving  factors: physical activity , massage and pressure to neck. she has knot on L side that really helps when pressed. sleep: getting plenty of hours but has been exhausting last few weeks. depression/anxiety: mild anxiety current treatments:topomax 50mg HS. could not tolerate during day. failed treatments: sumatriptan, topiramate RR for severe side effects but is efficacious for migraine imaging hx:none 11/7/22: Pt started trokendi last visit which was not helpful. She was given nurtec abortive which was very helpful. She would like to discuss alternative preventative. review of symptoms from last visit remain unchanged. She was also able to get MRI brain and MRA head and neck as ordered. Norman Pineda MD  [address], [place], [address], [facility] 11/07/2022 14:55:58   12/08/2022 text/html HPI Notes: Purnima Granados is a ambidextrous 21 Female who is here today for headaches. He/She denies chest pain, SOB, focal weakness, LOS, LOC, speech disturbance, and visual disturbance,since last exam. previously followed by Dr. Mayer who is retiring. He started her on topiramate 7/7/22 which is working but causing side effects of fatigue and paresthesias in face and L side of body. Prior to starting topomax had L paresthesia as well but was diagnosed with B12. She has not had any labs for at least three years and has not had any imaging. Last headache: 2 mos ago Location of headache: behind both eyes. and neck photo/phonophobia: she has these with migraine and does have these without pain once or twice monthly Radiation:from neck to behind eyes Character of headache: begins achy and then sets in as sharp visual disturbance: none with HA. Frequency: since starting less than one monthly. Prior to topomax 3wkly Duration of headache:a day long at least may go 3-4 days nausea:none Aggravating factors (physical activity/cough/bending over/ stress/ sleep/weather/foods/etoh):  "sunlight, loud noises, dehydration Relieving factors: physical activity , massage and pressure to neck. she has knot on L side that really helps when pressed. sleep: getting plenty of hours but has been exhausting last few weeks. depression/anxiety: mild anxiety current treatments:topomax 50mg HS. could not tolerate during day. failed treatments: sumatriptan, topiramate RR for severe side effects but is efficacious for migraine imaging hx:none 11/7/22: Pt started trokendi last visit which was not helpful. She was given nurtec abortive which was very helpful. She would like to discuss alternative preventative. review of symptoms from last visit remain unchanged. She was also able to get MRI brain and MRA head and neck as ordered. 12/7/22: Pt has started aimovig and is now only having 3HA per month only required nurtec twice. She has not gotten referral from [facility] for cardiology. She has not heard from [facility] [place] for small aneurysm either.          MRA, Head, w/o con, 10/14/22:  There may be a 1x2mm aneurysm involving the P1 segment of the right posterior cerebral artery.      MRA, Neck, w/o con, 10/14/22:  Intact carotid and vertebral artery systems of the neck.      MRI, Brain, w/o con, 10/14/22:  The MRI examination of the brain is unremarkable.         Review of Systems  Constitutional, neuro, ENT, endocrine, pulmonary, cardiac, gastrointestinal, genitourinary, musculoskeletal, integument and psychiatric systems are negative, except as otherwise noted.      Objective    /77 (BP Location: Left arm, Patient Position: Sitting, Cuff Size: Adult Regular)   Pulse 76   Temp 98.3  F (36.8  C) (Tympanic)   Resp 18   Ht 1.626 m (5' 4\")   Wt 74.1 kg (163 lb 4.8 oz)   LMP 07/26/2024 (Exact Date)   SpO2 99%   BMI 28.03 kg/m    Body mass index is 28.03 kg/m .  Physical Exam   GENERAL: alert and no distress  NECK: no adenopathy, no asymmetry, masses, or scars  RESP: lungs clear to auscultation - no rales, " rhonchi or wheezes  CV: regular rates and rhythm, normal S1 S2, no S3 or S4, no murmur, click or rub, and no peripheral edema  ABDOMEN: soft, nontender, no hepatosplenomegaly, no masses and bowel sounds normal  MS: no gross musculoskeletal defects noted, no edema  SKIN: no suspicious lesions or rashes  NEURO: Normal strength and tone, mentation intact and speech normal    Results for orders placed or performed in visit on 10/02/24   Vitamin B12     Status: Normal   Result Value Ref Range    Vitamin B12 539 232 - 1,245 pg/mL   Hepatic panel (Albumin, ALT, AST, Bili, Alk Phos, TP)     Status: Abnormal   Result Value Ref Range    Protein Total 7.4 6.4 - 8.3 g/dL    Albumin 4.5 3.5 - 5.2 g/dL    Bilirubin Total 0.5 <=1.2 mg/dL    Alkaline Phosphatase 38 (L) 40 - 150 U/L    AST 17 0 - 45 U/L    ALT 15 0 - 50 U/L    Bilirubin Direct <0.20 0.00 - 0.30 mg/dL           Signed Electronically by: Silva Self MD

## 2024-10-03 ENCOUNTER — HOSPITAL ENCOUNTER (OUTPATIENT)
Dept: ULTRASOUND IMAGING | Facility: HOSPITAL | Age: 24
Discharge: HOME | End: 2024-10-03
Attending: FAMILY MEDICINE | Admitting: FAMILY MEDICINE
Payer: COMMERCIAL

## 2024-10-03 ENCOUNTER — TELEPHONE (OUTPATIENT)
Dept: FAMILY MEDICINE | Facility: OTHER | Age: 24
End: 2024-10-03

## 2024-10-03 DIAGNOSIS — R10.9 ABDOMINAL BLOATING WITH CRAMPS: ICD-10-CM

## 2024-10-03 DIAGNOSIS — R14.0 ABDOMINAL BLOATING WITH CRAMPS: ICD-10-CM

## 2024-10-03 DIAGNOSIS — F90.0 ATTENTION DEFICIT HYPERACTIVITY DISORDER (ADHD), PREDOMINANTLY INATTENTIVE TYPE: ICD-10-CM

## 2024-10-03 DIAGNOSIS — F31.32 BIPOLAR AFFECTIVE DISORDER, CURRENTLY DEPRESSED, MODERATE (H): Primary | ICD-10-CM

## 2024-10-03 LAB — VIT B12 SERPL-MCNC: 539 PG/ML (ref 232–1245)

## 2024-10-03 PROCEDURE — 76705 ECHO EXAM OF ABDOMEN: CPT

## 2024-10-03 NOTE — TELEPHONE ENCOUNTER
Central Prior Authorization Team  Phone: [phone]    PRIOR AUTHORIZATION DENIED    Medication: QELBREE 100 MG PO CP24  Insurance Company: [employer] - Phone [phone] Fax [phone]  Denial Date: 10/2/2024  Denial Reason(s):         Appeal Information:         Patient Notified: NO- Unfortunately, we cannot call the patient with denials because we do not know what next steps the MD will take nor can we give medical advice, please notify the patient of what they are to expect for the continuation of their therapy from the provider.

## 2024-10-04 ENCOUNTER — MYC MEDICAL ADVICE (OUTPATIENT)
Dept: FAMILY MEDICINE | Facility: OTHER | Age: 24
End: 2024-10-04

## 2024-10-04 ENCOUNTER — TELEPHONE (OUTPATIENT)
Dept: FAMILY MEDICINE | Facility: OTHER | Age: 24
End: 2024-10-04

## 2024-10-04 ENCOUNTER — TELEPHONE (OUTPATIENT)
Dept: CARE COORDINATION | Facility: OTHER | Age: 24
End: 2024-10-04

## 2024-10-04 DIAGNOSIS — G43.E09 CHRONIC MIGRAINE WITH AURA WITHOUT STATUS MIGRAINOSUS, NOT INTRACTABLE: Primary | ICD-10-CM

## 2024-10-04 NOTE — TELEPHONE ENCOUNTER
Patient called and left message on Triage line asking for all of her medications to be switched to generic and her insurance company does not cover name brand. RN spoke with [place]'s pharmacy who said all but 2 of the medications were generic and we are just waiting on the prior Auth which we should here back from on Monday. RN called patient back but she did not answer the phone. VM message left informing her we should here back Monday but if she want to switch to something else she could call me back. Contact number left on .

## 2024-10-04 NOTE — TELEPHONE ENCOUNTER
Received PA request from [facility] for viloxazine ER (QELBREE) 100 MG CP24 capsule. Submitted on Cmm, waiting for response.

## 2024-10-04 NOTE — TELEPHONE ENCOUNTER
Received PA request from [facility] for rimegepant (NURTEC) 75 MG ODT tablet. Submitted on CMM, waiting for response.

## 2024-10-06 ENCOUNTER — HEALTH MAINTENANCE LETTER (OUTPATIENT)
Age: 24
End: 2024-10-06

## 2024-10-07 ENCOUNTER — TELEPHONE (OUTPATIENT)
Dept: FAMILY MEDICINE | Facility: OTHER | Age: 24
End: 2024-10-07

## 2024-10-07 ENCOUNTER — TELEPHONE (OUTPATIENT)
Dept: CARE COORDINATION | Facility: OTHER | Age: 24
End: 2024-10-07

## 2024-10-07 ENCOUNTER — PATIENT OUTREACH (OUTPATIENT)
Dept: CARE COORDINATION | Facility: OTHER | Age: 24
End: 2024-10-07

## 2024-10-07 RX ORDER — VENLAFAXINE HYDROCHLORIDE 75 MG/1
75 CAPSULE, EXTENDED RELEASE ORAL DAILY
Qty: 30 CAPSULE | Refills: 3 | Status: SHIPPED | OUTPATIENT
Start: 2024-10-07 | End: 2024-10-09

## 2024-10-07 NOTE — TELEPHONE ENCOUNTER
Received PA DENIAL from [facility] for rimegepant (NURTEC) 75 MG ODT tablet. Scanned into EPIC, Routed to provider.  Reasoning: Must meet one of the following:

## 2024-10-07 NOTE — TELEPHONE ENCOUNTER
Patient left  message on Triage line requesting different medication due to financial hardship. rimegepant (NURTEC) 75 MG ODT tablet and viloxazine ER (QELBREE) 100 MG CP24 capsule  PRIOR auth denied.    RN to send to PCP to advise.

## 2024-10-07 NOTE — TELEPHONE ENCOUNTER
Writer called number provided, person states pt will not be using this phone for communication.  Tamela Griffiths LPN

## 2024-10-07 NOTE — Clinical Note
RN CC called patient to let her know that alternative medications were ordered by provider and sent to [facility] pharmacy.  RN advised patient to called with any other needs or concerns.

## 2024-10-07 NOTE — TELEPHONE ENCOUNTER
Received PA DENIAL for viloxazine ER (QELBREE) 100 MG CP24 capsule.   Reasoning: Must try and fail formularies listed below. Routed to provider, scanned into EPIC.

## 2024-10-07 NOTE — TELEPHONE ENCOUNTER
Care Team 3   /    Dr. Self    Patient requesting call on TUES 10/8/24 to speak with nurse.  Did not leave details.  Phone number to use is  [phone] (friend Macho's phone) as hers is not activated.      Thank you

## 2024-10-08 ENCOUNTER — TELEPHONE (OUTPATIENT)
Dept: FAMILY MEDICINE | Facility: OTHER | Age: 24
End: 2024-10-08

## 2024-10-08 ASSESSMENT — COLUMBIA-SUICIDE SEVERITY RATING SCALE - C-SSRS
6. IN YOUR LIFETIME, HAVE YOU EVER DONE ANYTHING, STARTED TO DO ANYTHING, OR PREPARED TO DO ANYTHING TO END YOUR LIFE?: NO
2. HAVE YOU ACTUALLY HAD ANY THOUGHTS OF KILLING YOURSELF?: NO
1. IN THE PAST MONTH, HAVE YOU WISHED YOU WERE DEAD OR WISHED YOU COULD GO TO SLEEP AND NOT WAKE UP?: YES

## 2024-10-08 ASSESSMENT — PATIENT HEALTH QUESTIONNAIRE - PHQ9
10. IF YOU CHECKED OFF ANY PROBLEMS, HOW DIFFICULT HAVE THESE PROBLEMS MADE IT FOR YOU TO DO YOUR WORK, TAKE CARE OF THINGS AT HOME, OR GET ALONG WITH OTHER PEOPLE: EXTREMELY DIFFICULT
SUM OF ALL RESPONSES TO PHQ QUESTIONS 1-9: 22
SUM OF ALL RESPONSES TO PHQ QUESTIONS 1-9: 22

## 2024-10-08 NOTE — PROGRESS NOTES
Subjective Finding:    Chief compalint: Patient presents with:  Back Pain: With neck pain   , Pain Scale: 3/10, Intensity: sharp, Duration: 1 weeks, Radiating: bilateral buttock.    Date of injury:     Activities that the pain restricts:   Home/household/hobbies/social activities: Yes.  Work duties: Yes.  Sleep: No.  Makes symptoms better: rest.  Makes symptoms worse: lumbar flexion and cervical flexion.  Have you seen anyone else for the symptoms? No.  Work related: No.  Automobile related injury: No.    Objective and Assessment:    Posture Analysis:   High shoulder: .  Head tilt: .  High iliac crest: .  Head carriage: neutral.  Thoracic Kyphosis: neutral.  Lumbar Lordosis: forward.    Lumbar Range of Motion: extension decreased.  Cervical Range of Motion: .  Thoracic Range of Motion: .  Extremity Range of Motion: .    Palpation:   Quad lumb: bilateral, referred pain: no  Lev scapulae: dull pain, referred pain: no    Segmental dysfunction pre-treatment and treatment area: C2, T6, T7, L4, and L5.    Assessment post-treatment:  Cervical: ROM increased.  Thoracic: ROM increased.  Lumbar: ROM increased.    Comments: .      Complicating Factors: .    Procedure(s):  CMT:  33385 Chiropractic manipulative treatment 3-4 regions performed   Cervical: Diversified, See above for level, Supine, Thoracic: Diversified, See above for level, Prone, and Lumbar: Diversified, See above for level, Side posture    Modalities:  None performed this visit    Therapeutic procedures:  None    Plan:  Treatment plan: PRN.  Instructed patient: stretch as instructed at visit.  Short term goals: reduce pain.  Long term goals: increase ADL.  Prognosis: excellent.

## 2024-10-08 NOTE — TELEPHONE ENCOUNTER
Retail Pharmacy Prior Authorization Team   Phone: [phone]    PRIOR AUTHORIZATION DENIED    DRUG HAS BEEN APPROVED WITHIN QTY LIMITS UNTIL 10/8/2025    Medication: UBRELVY 50 MG PO TABS  Insurance Company: [employer] [place] - Phone [phone] Fax [phone]  Denial Date: 10/8/2024  Denial Reason(s): QTY REQUESTED EXCEEDS PLAN LIMITS      Appeal Information: IF THE PROVIDER WOULD LIKE TO APPEAL THIS DECISION PLEASE PROVIDE THE PA TEAM WITH A LETTER OF MEDICAL NECESSITY      Patient Notified: NO

## 2024-10-09 ENCOUNTER — OFFICE VISIT (OUTPATIENT)
Dept: PSYCHIATRY | Facility: OTHER | Age: 24
End: 2024-10-09
Payer: COMMERCIAL

## 2024-10-09 VITALS
OXYGEN SATURATION: 99 % | DIASTOLIC BLOOD PRESSURE: 76 MMHG | TEMPERATURE: 98 F | WEIGHT: 165 LBS | BODY MASS INDEX: 28.17 KG/M2 | HEIGHT: 64 IN | SYSTOLIC BLOOD PRESSURE: 114 MMHG | HEART RATE: 82 BPM

## 2024-10-09 DIAGNOSIS — F42.9 OBSESSIVE-COMPULSIVE DISORDER, UNSPECIFIED TYPE: ICD-10-CM

## 2024-10-09 DIAGNOSIS — F41.1 GAD (GENERALIZED ANXIETY DISORDER): ICD-10-CM

## 2024-10-09 DIAGNOSIS — F42.4 EXCORIATION (SKIN-PICKING) DISORDER: ICD-10-CM

## 2024-10-09 DIAGNOSIS — F51.01 PRIMARY INSOMNIA: Primary | ICD-10-CM

## 2024-10-09 DIAGNOSIS — F33.0 MAJOR DEPRESSIVE DISORDER, RECURRENT EPISODE, MILD (H): ICD-10-CM

## 2024-10-09 PROCEDURE — G0463 HOSPITAL OUTPT CLINIC VISIT: HCPCS

## 2024-10-09 PROCEDURE — 99205 OFFICE O/P NEW HI 60 MIN: CPT

## 2024-10-09 RX ORDER — VENLAFAXINE HYDROCHLORIDE 37.5 MG/1
37.5 CAPSULE, EXTENDED RELEASE ORAL DAILY
Qty: 30 CAPSULE | Refills: 1 | Status: SHIPPED | OUTPATIENT
Start: 2024-10-09

## 2024-10-09 ASSESSMENT — PAIN SCALES - GENERAL: PAINLEVEL: MODERATE PAIN (5)

## 2024-10-09 NOTE — PATIENT INSTRUCTIONS
- Reduce Effexor XR to 37.5 mg daily  - For the future may consider the following for OCD: fluvoxamine (Luvox) or vilazodone (Viibryd)  - Follow up in 1 month  Thank you for allowing Lori Flores DNP, APRN to participate in your care.  If you have a scheduling or an appointment question please contact our scheduling department at their direct line [phone].   ALL nursing questions or concerns can be directed to the psychiatry nurses at [phone] or [phone]
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
## 2024-10-11 ENCOUNTER — PATIENT OUTREACH (OUTPATIENT)
Dept: CARE COORDINATION | Facility: OTHER | Age: 24
End: 2024-10-11

## 2024-10-11 NOTE — PROGRESS NOTES
Clinic Care Coordination Contact  Care Team Conversations    RN CC called to check in with patient but she was not available. RN to call again next Monday 10/14/2024.    Kimberly Boecker, RONY  Care Coordination

## 2024-10-11 NOTE — Clinical Note
RN CC called to check in with patient but she was not available. RN to call again next Monday 10/14/2024.

## 2024-10-14 ENCOUNTER — HOSPITAL ENCOUNTER (OUTPATIENT)
Dept: MRI IMAGING | Facility: HOSPITAL | Age: 24
Discharge: HOME | End: 2024-10-14
Attending: FAMILY MEDICINE | Admitting: FAMILY MEDICINE
Payer: COMMERCIAL

## 2024-10-14 DIAGNOSIS — I67.1 INTRACRANIAL ANEURYSM: ICD-10-CM

## 2024-10-14 PROCEDURE — 70544 MR ANGIOGRAPHY HEAD W/O DYE: CPT

## 2024-10-14 PROCEDURE — 255N000002 HC RX 255 OP 636: Performed by: RADIOLOGY

## 2024-10-14 PROCEDURE — 70553 MRI BRAIN STEM W/O & W/DYE: CPT

## 2024-10-14 PROCEDURE — A9585 GADOBUTROL INJECTION: HCPCS | Performed by: RADIOLOGY

## 2024-10-14 RX ORDER — GADOBUTROL 604.72 MG/ML
7.5 INJECTION INTRAVENOUS ONCE
Status: COMPLETED | OUTPATIENT
Start: 2024-10-14 | End: 2024-10-14

## 2024-10-14 RX ADMIN — GADOBUTROL 7.5 ML: 604.72 INJECTION INTRAVENOUS at 18:14

## 2024-10-21 ENCOUNTER — OFFICE VISIT (OUTPATIENT)
Dept: CHIROPRACTIC MEDICINE | Facility: OTHER | Age: 24
End: 2024-10-21
Payer: COMMERCIAL

## 2024-10-21 DIAGNOSIS — M99.01 SEGMENTAL AND SOMATIC DYSFUNCTION OF CERVICAL REGION: Primary | ICD-10-CM

## 2024-10-21 DIAGNOSIS — M99.02 SEGMENTAL AND SOMATIC DYSFUNCTION OF THORACIC REGION: ICD-10-CM

## 2024-10-21 DIAGNOSIS — M99.03 SEGMENTAL AND SOMATIC DYSFUNCTION OF LUMBAR REGION: ICD-10-CM

## 2024-10-21 DIAGNOSIS — M54.2 CERVICALGIA: ICD-10-CM

## 2024-10-21 PROCEDURE — 98941 CHIROPRACT MANJ 3-4 REGIONS: CPT | Mod: AT | Performed by: CHIROPRACTOR

## 2024-10-21 NOTE — PROGRESS NOTES
Subjective Finding:    Chief compalint: Patient presents with:  Neck Pain: With back pain   , Pain Scale: 4/10, Intensity: dull and ache, Duration: 2 days, Radiating: no.    Date of injury:     Activities that the pain restricts:   Home/household/hobbies/social activities: Yes.  Work duties: No.  Sleep: No.  Makes symptoms better: rest.  Makes symptoms worse: lumbar flexion and cervical flexion.  Have you seen anyone else for the symptoms? No.  Work related: No.  Automobile related injury: No.    Objective and Assessment:    Posture Analysis:   High shoulder: .  Head tilt: .  High iliac crest: .  Head carriage: neutral.  Thoracic Kyphosis: neutral.  Lumbar Lordosis: neutral.    Lumbar Range of Motion: flexion decreased and extension decreased.  Cervical Range of Motion: left rotation decreased and right rotation decreased.  Thoracic Range of Motion: .  Extremity Range of Motion: .    Palpation:   Sub-occiput: stiff, referred pain: no    Segmental dysfunction pre-treatment and treatment area: C4, T2, T3, L4, and L5.    Assessment post-treatment:  Cervical: ROM increased.  Thoracic: ROM increased.  Lumbar: ROM increased.    Comments: .      Complicating Factors: .    Procedure(s):  CMT:  57905 Chiropractic manipulative treatment 3-4 regions performed   Cervical: Diversified, See above for level, Supine, Thoracic: Diversified, See above for level, Prone, and Lumbar: Diversified, See above for level, Side posture    Modalities:  None performed this visit    Therapeutic procedures:  None    Plan:  Treatment plan: PRN.  Instructed patient: stretch as instructed at visit.  Short term goals: reduce pain.  Long term goals: increase ADL.  Prognosis: very good.

## 2024-10-26 ENCOUNTER — MYC MEDICAL ADVICE (OUTPATIENT)
Dept: FAMILY MEDICINE | Facility: OTHER | Age: 24
End: 2024-10-26

## 2024-10-29 ENCOUNTER — MYC MEDICAL ADVICE (OUTPATIENT)
Dept: PSYCHIATRY | Facility: OTHER | Age: 24
End: 2024-10-29

## 2024-10-29 DIAGNOSIS — G43.E09 CHRONIC MIGRAINE WITH AURA WITHOUT STATUS MIGRAINOSUS, NOT INTRACTABLE: ICD-10-CM

## 2024-10-29 NOTE — TELEPHONE ENCOUNTER
Patient would like to try 100 mg of medication as discussed in [facility]. Pended if wish.  Tamela Griffiths LPN

## 2024-10-29 NOTE — TELEPHONE ENCOUNTER
Would she like to increase the dose to 100mg?    We are limited to 1600 mg in a month and less than 200mg in a 24 hour period. So we have some room.     Silva Self MD

## 2024-11-04 NOTE — PROGRESS NOTES
"  Assessment & Plan     Migraine without status migrainosus, not intractable, unspecified migraine type  Has had good response with ubrelvy for abortive treatment, although not complete. Will increase dose per previous X2TVt messaging. Will also initiate preventative, pt has tolerated aimovig in the past, will order this, if covered.   - erenumab-aooe (AIMOVIG) 70 MG/ML injection  Dispense: 3 mL; Refill: 3    Vitamin D deficiency  Patient requesting testing, is taking supplement, but not consistently.   - Vitamin D Deficiency  - Vitamin D Deficiency    Abdominal bloating / Food intolerance in adult  Pt notes fairly rapid onset of GI distress with certain foods. Mostly acidic or heavy/diary related to foods. Has not had improvement with lactaid use or elim diet. Recommend food allergy testing, consider GI referral.   - Adult ENT  Referral    - Tissue transglutaminase sabrina IgA and IgG  - Tissue transglutaminase sabrina IgA and IgG    BMI  Estimated body mass index is 29.08 kg/m  as calculated from the following:    Height as of this encounter: 1.626 m (5' 4\").    Weight as of this encounter: 76.8 kg (169 lb 6.4 oz).     No follow-ups on file.    May Vences is a 23 year old, presenting for the following health issues:  Headache        11/5/2024    12:58 PM   Additional Questions   Roomed by Chitra Jimenez   Accompanied by self     History of Present Illness       Headaches:   Since the patient's last clinic visit, headaches are: improved  The patient is getting headaches:  Every other day  She is not able to do normal daily activities when she has a migraine.  The patient is taking the following rescue/relief medications:  Other   Patient states \"The relief is inconsistent\" from the rescue/relief medications.   The patient is taking the following medications to prevent migraines:  No medications to prevent migraines  In the past 4 weeks, the patient has gone to an Urgent Care or Emergency Room 0 times " "times due to headaches.    Reason for visit:  Follow up, back pt referral, acupuncture refferal    She eats 2-3 servings of fruits and vegetables daily.She consumes 1 sweetened beverage(s) daily.She exercises with enough effort to increase her heart rate 10 to 19 minutes per day.  She exercises with enough effort to increase her heart rate 3 or less days per week.   She is taking medications regularly.       Migraine   Since your last clinic visit, how have your headaches changed?  Improved  How often are you getting headaches or migraines? Every other day   Are you able to do normal daily activities when you have a migraine? No  Are you taking rescue/relief medications? (Select all that apply) Other: Ubrevly  How helpful is your rescue/relief medication?  The relief is inconsistent  Are you taking any medications to prevent migraines? (Select all that apply)  No  In the past 4 weeks, how often have you gone to urgent care or the emergency room because of your headaches?  0    Review of Systems  Constitutional, neuro, ENT, endocrine, pulmonary, cardiac, gastrointestinal, genitourinary, musculoskeletal, integument and psychiatric systems are negative, except as otherwise noted.      Objective    /70   Pulse 92   Temp 98.9  F (37.2  C) (Tympanic)   Resp 16   Ht 1.626 m (5' 4\")   Wt 76.8 kg (169 lb 6.4 oz)   SpO2 99%   BMI 29.08 kg/m    Body mass index is 29.08 kg/m .  Physical Exam   GENERAL: alert and no distress  EYES: Eyes grossly normal to inspection  NECK: no adenopathy, no asymmetry, masses, or scars  RESP: lungs clear to auscultation - no rales, rhonchi or wheezes  CV: regular rates and rhythm, normal S1 S2, no S3 or S4, no murmur, click or rub, and no peripheral edema  ABDOMEN: soft, nontender, no hepatosplenomegaly, no masses and bowel sounds normal  MS: no gross musculoskeletal defects noted, no edema  SKIN: no suspicious lesions or rashes  NEURO: Normal strength and tone, mentation intact, and " speech normal  PSYCH: mentation appears normal, affect normal/bright    Results for orders placed or performed in visit on 11/05/24   Vitamin D Deficiency     Status: Normal   Result Value Ref Range    Vitamin D, Total (25-Hydroxy) 21 20 - 50 ng/mL    Narrative    Season, race, dietary intake, and treatment affect the concentration of 25-hydroxy-Vitamin D. Values may decrease during winter months and increase during summer months.    Vitamin D determination is routinely performed by an immunoassay specific for 25 hydroxyvitamin D3.  If an individual is on vitamin D2(ergocalciferol) supplementation, please specify 25 OH vitamin D2 and D3 level determination by LCMSMS test VITD23.           Signed Electronically by: Silva Self MD

## 2024-11-05 ENCOUNTER — OFFICE VISIT (OUTPATIENT)
Dept: FAMILY MEDICINE | Facility: OTHER | Age: 24
End: 2024-11-05
Attending: FAMILY MEDICINE
Payer: COMMERCIAL

## 2024-11-05 VITALS
WEIGHT: 169.4 LBS | HEART RATE: 92 BPM | HEIGHT: 64 IN | DIASTOLIC BLOOD PRESSURE: 70 MMHG | TEMPERATURE: 98.9 F | RESPIRATION RATE: 16 BRPM | SYSTOLIC BLOOD PRESSURE: 100 MMHG | BODY MASS INDEX: 28.92 KG/M2 | OXYGEN SATURATION: 99 %

## 2024-11-05 DIAGNOSIS — G43.909 MIGRAINE WITHOUT STATUS MIGRAINOSUS, NOT INTRACTABLE, UNSPECIFIED MIGRAINE TYPE: Primary | ICD-10-CM

## 2024-11-05 DIAGNOSIS — E55.9 VITAMIN D DEFICIENCY: ICD-10-CM

## 2024-11-05 DIAGNOSIS — R14.0 ABDOMINAL BLOATING: ICD-10-CM

## 2024-11-05 DIAGNOSIS — K90.49 FOOD INTOLERANCE IN ADULT: ICD-10-CM

## 2024-11-05 PROCEDURE — G2211 COMPLEX E/M VISIT ADD ON: HCPCS | Performed by: FAMILY MEDICINE

## 2024-11-05 PROCEDURE — 99214 OFFICE O/P EST MOD 30 MIN: CPT | Performed by: FAMILY MEDICINE

## 2024-11-05 PROCEDURE — G0463 HOSPITAL OUTPT CLINIC VISIT: HCPCS | Mod: 25

## 2024-11-05 PROCEDURE — 36415 COLL VENOUS BLD VENIPUNCTURE: CPT | Mod: ZL | Performed by: FAMILY MEDICINE

## 2024-11-05 PROCEDURE — 82306 VITAMIN D 25 HYDROXY: CPT | Mod: ZL | Performed by: FAMILY MEDICINE

## 2024-11-05 PROCEDURE — 86364 TISS TRNSGLTMNASE EA IG CLAS: CPT | Mod: ZL | Performed by: FAMILY MEDICINE

## 2024-11-05 PROCEDURE — G0463 HOSPITAL OUTPT CLINIC VISIT: HCPCS

## 2024-11-05 RX ORDER — TRIAMCINOLONE ACETONIDE 1 MG/G
CREAM TOPICAL
COMMUNITY

## 2024-11-05 ASSESSMENT — PAIN SCALES - GENERAL: PAINLEVEL_OUTOF10: NO PAIN (0)

## 2024-11-06 ENCOUNTER — TELEPHONE (OUTPATIENT)
Dept: FAMILY MEDICINE | Facility: OTHER | Age: 24
End: 2024-11-06

## 2024-11-06 NOTE — TELEPHONE ENCOUNTER
Received PA request from [facility] for erenumab-aooe (AIMOVIG) 70 MG/ML injection. Submitted on CMM, waiting for response.

## 2024-11-07 LAB — VIT D+METAB SERPL-MCNC: 21 NG/ML (ref 20–50)

## 2024-11-08 ASSESSMENT — PATIENT HEALTH QUESTIONNAIRE - PHQ9
SUM OF ALL RESPONSES TO PHQ QUESTIONS 1-9: 16
SUM OF ALL RESPONSES TO PHQ QUESTIONS 1-9: 16
10. IF YOU CHECKED OFF ANY PROBLEMS, HOW DIFFICULT HAVE THESE PROBLEMS MADE IT FOR YOU TO DO YOUR WORK, TAKE CARE OF THINGS AT HOME, OR GET ALONG WITH OTHER PEOPLE: VERY DIFFICULT

## 2024-11-08 ASSESSMENT — ANXIETY QUESTIONNAIRES
GAD7 TOTAL SCORE: 17
2. NOT BEING ABLE TO STOP OR CONTROL WORRYING: NEARLY EVERY DAY
7. FEELING AFRAID AS IF SOMETHING AWFUL MIGHT HAPPEN: MORE THAN HALF THE DAYS
1. FEELING NERVOUS, ANXIOUS, OR ON EDGE: NEARLY EVERY DAY
3. WORRYING TOO MUCH ABOUT DIFFERENT THINGS: NEARLY EVERY DAY
GAD7 TOTAL SCORE: 17
5. BEING SO RESTLESS THAT IT IS HARD TO SIT STILL: MORE THAN HALF THE DAYS
7. FEELING AFRAID AS IF SOMETHING AWFUL MIGHT HAPPEN: MORE THAN HALF THE DAYS
IF YOU CHECKED OFF ANY PROBLEMS ON THIS QUESTIONNAIRE, HOW DIFFICULT HAVE THESE PROBLEMS MADE IT FOR YOU TO DO YOUR WORK, TAKE CARE OF THINGS AT HOME, OR GET ALONG WITH OTHER PEOPLE: VERY DIFFICULT
6. BECOMING EASILY ANNOYED OR IRRITABLE: SEVERAL DAYS
GAD7 TOTAL SCORE: 17
4. TROUBLE RELAXING: NEARLY EVERY DAY
8. IF YOU CHECKED OFF ANY PROBLEMS, HOW DIFFICULT HAVE THESE MADE IT FOR YOU TO DO YOUR WORK, TAKE CARE OF THINGS AT HOME, OR GET ALONG WITH OTHER PEOPLE?: VERY DIFFICULT

## 2024-11-08 NOTE — TELEPHONE ENCOUNTER
Received PA DENIAL from [facility] for erenumab-aooe (AIMOVIG) 70 MG/ML injection.  Scanned into [facility], routed to provider.  Reasoning: Must have acute DX code

## 2024-11-09 ENCOUNTER — MYC MEDICAL ADVICE (OUTPATIENT)
Dept: FAMILY MEDICINE | Facility: OTHER | Age: 24
End: 2024-11-09

## 2024-11-09 DIAGNOSIS — G43.909 MIGRAINE WITHOUT STATUS MIGRAINOSUS, NOT INTRACTABLE, UNSPECIFIED MIGRAINE TYPE: Primary | ICD-10-CM

## 2024-11-11 ENCOUNTER — VIRTUAL VISIT (OUTPATIENT)
Dept: PSYCHIATRY | Facility: OTHER | Age: 24
End: 2024-11-11
Payer: COMMERCIAL

## 2024-11-11 DIAGNOSIS — F33.0 MAJOR DEPRESSIVE DISORDER, RECURRENT EPISODE, MILD (H): ICD-10-CM

## 2024-11-11 DIAGNOSIS — F41.1 GAD (GENERALIZED ANXIETY DISORDER): ICD-10-CM

## 2024-11-11 DIAGNOSIS — F42.4 EXCORIATION (SKIN-PICKING) DISORDER: Primary | ICD-10-CM

## 2024-11-11 DIAGNOSIS — F42.9 OBSESSIVE-COMPULSIVE DISORDER, UNSPECIFIED TYPE: ICD-10-CM

## 2024-11-11 DIAGNOSIS — F51.01 PRIMARY INSOMNIA: ICD-10-CM

## 2024-11-11 LAB
TTG IGA SER-ACNC: 0.4 U/ML
TTG IGG SER-ACNC: 0.8 U/ML

## 2024-11-11 RX ORDER — VILAZODONE HYDROCHLORIDE 10 MG/1
TABLET ORAL
Qty: 67 TABLET | Refills: 0 | Status: SHIPPED | OUTPATIENT
Start: 2024-11-11 | End: 2024-12-18

## 2024-11-11 NOTE — TELEPHONE ENCOUNTER
RN CC has pended pharmacy for patient request.  Please see their CHEQROOMt message with alternative options that patient states are covered for Aimovig.    EMGALITY (galcanezumab-gnlm)             Auto injector 120 mg/ml     AJOVY (erenumab-aooe)           Auto injector 140 mg/ml    Patient states she would prefer [facility].    Please advise.  Thank you  Mi Hawk RN CC

## 2024-11-11 NOTE — TELEPHONE ENCOUNTER
[facility] called in. Please update pt and call attention to the fact there is a loading dose for the first dose. Then lower dose from there.

## 2024-11-11 NOTE — PATIENT INSTRUCTIONS
Thank you for allowing Lori Flores DNP, APRN to participate in your care.  If you have a scheduling or an appointment question please contact our scheduling department at their direct line [phone].   ALL nursing questions or concerns can be directed to the psychiatry nurses at [phone] or [phone]

## 2024-11-11 NOTE — PROGRESS NOTES
OUTPATIENT PSYCHIATRY: VIDEO FOLLOW UP      Identifying Information:  Vangie Granados MRN# [id number]   Age: 23 year old YOB: 2000     Initial Psychiatry Visit Date: 10/9/24         Video Visit     This synchronous service was provided via televideo (audio-video) and was determined by the provider and patient to be an appropriate and effective means for service delivery. Patient has given verbal consent for video visit.    Date of service:  11/11/2024      Type of service:  Video visit for mental health treatment.  Time of service: 4:00 PM  Video Start Time: 3:57 PM      Video End Time: 4:36 PM    Reason for video visit: [place]  Patient location[place]  Provider location:  [place], [place]  Mode of Communication:  Audio-Video Conference         Assessment & Plan     This patient is a 23 year old female who is seen today for follow up. She said the Effexor is really helping the depression/mood, but social anxiety and skin picking is worse. She would like to stay on the Effexor but would like something more specifically for OCD/social anxiety, noting to stay away from Serotonin agents due to sensitivity. She asked about clomipramine and we discussed the medication, and dicussed it not being a first-line choice. We discussed the addition of vilazodone (Viibryd) to target OCD. Educated on the risks, benefits and side effects including nausea, diarrhea, vomiting, dry mouth, insomnia, dizziness, activation, sexual dysfunction. Patient consents to treatment. Did discuss the sertraline presence in Viibryd, and she will monitor for any mood changes or suicidal ideation.    (F41.1) OSCAR (generalized anxiety disorder)   Plan: venlafaxine (EFFEXOR XR) 37.5 MG 24 hr capsule     (F42.4) Excoriation (skin-picking) disorder (primary encounter diagnosis)  Plan: venlafaxine (EFFEXOR XR) 37.5 MG 24 hr capsule  vilazodone (VIIBRYD) 10 MG TABS tablet Take 1 tablet (10 mg)  by mouth daily with food for 7 days, THEN 2 tablets (20 mg) daily      (F42.9) Obsessive-compulsive disorder, unspecified type  Plan: venlafaxine (EFFEXOR XR) 37.5 MG 24 hr capsule  vilazodone (VIIBRYD) 10 MG TABS tablet Take 1 tablet (10 mg) by mouth daily with food for 7 days, THEN 2 tablets (20 mg) daily      (F33.0) Major depressive disorder, recurrent episode, mild (H)  Plan: venlafaxine (EFFEXOR XR) 37.5 MG 24 hr capsule     (F51.01) Primary insomnia    Plan: Not taking anything currently    PMH: Migraines, 10-12 per month; Pt does have 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 hx of aneurism on mom's side  From Dr. Self note 10/2/24: Bipolar affective disorder, currently depressed, moderate (H) / ADHD  Previous diagnosis. Did fairly well on lithium, but stopped due to side effects she felt it was causing. She is also unclear if she truly has bipolar disorder. Based on previous conversations with her, does sound like there may have been some markus/hypomania, but current mood is depressed. Pt has done some research interested in strattera or qelbree. Unclear if qelbree is covered, but this would be the preference given the serotonin effect and her depression currently. Otherwise consider effexor (migraines as well) or wellbutrin. Does have psychiatry follow up scheduled.     Laboratory: None    Referrals: She would like to do CBT for picking for at least 6 months. Will send a referral for therapy ([facility])  SAD light     Follow Up: Return to clinic in 2 months          History of Present Illness     Vangie Granados is a 23 year old female with a reported history of Excoriation (skin-picking) disorder, MDD, OSCAR, OCD, and insomnia who is here today for follow up. She was last seen on 10/9/24 when we lowered her Effexor to 37.5. Vangie was started on Effexor 75 mg prior to seeing me and she was experiencing side effects, so we bumped it down to 37.5 mg to see if that improves side  "effects and then if she tolerates an increase. Since started the Effexor the depression is pretty much gone. Feels with the Effexor the depression is so much better so she'd like to stay on it. Social anxiety has gone up by 1000% over the last month - for awhile she was ok but now she's really struggling. Paralyzed by scratching, takes a lot of willpower to stop scratching. 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 Working mornings but they are really bad with migraines. Easier to manage them in the afternoons. Will be working midnights during snowmaking seasons.         Psychiatric Review of Systems     Mood/Depression: \"frantic and depressed\" she feels mood has been steadily going down. Told her friend that she's living with that she's struggling, and he comes to visit her so they can visit. She does have 2 other close friends. Endorses depressed mood, anhedonia, low energy, poor concentration /memory, overwhelmed, and mood dysregulation, loss of interest in activities.    [place]: Switched major 8-9 times, gone to 5 different colleges; emotions are 200% if on a scale of 0-100%. Went through something with ex- where he pulled a gun on himself and threatened suicide and she just didn't care, there was no emotion, she wasn't scared or sad or upset about it.  Always felt like there was something wrong with her, like her emotions were just too bid.  Can have days that she stays up all night, she feels like this is hyperfocusing relating to ADHD.  She says it's always up and down within the same day.     Anxiety: Worse recently - the scratching is really bad right now - on face, head, back. Doesn't know what those are from - wasn't having those over the summer. Has " "Eczema and sensitive skin already - so she is scratching that which causes more anxiety. The [place] perpetuates. [place] and this is the first time back in [place] so she needs to be better at using lotion daily; needs to start using lotion  Endorses excessive worry, feeling fearful, social anxiety, and nervous/overwhelmed. Has gotten worse the last few days. Gets worse when she feels like she is under the influence of something (the Effexor making her drowsy)  Social anxiety - \"really bad\"  Social anxiety got worse when ADHD got worse.  Very big phobia of exoskeletons, beetles, leonila spiders.     OCD: Germophobe issues, depends on the issue, obsessive , patterns, 3's are bad for her, she has to say things three times. Nightmare for her, of a string, if the string gets tangled she will wake up in a nightmare, panic attack. If she's in somewhere and focusing on something if the tiles are bad or bubbles are bad, will triggers her her ocd and she is hyper focused on that, very embarrassing, like she can't move. Can take a bubble bath and not hyperfocus on the bubbles.      Panic Attacks: Endorses panic attacks. Symptoms include racing heart and SOB     Sleep: has had insomnia for a long time; marijuana would help, used to take benadryl. Has night terrors once in a while.     Appetite: when lived by herself she only ate what she needed to, it made her sad to cook alone. Now overeats - stress eats, bored eats     Concentration/Distractibility: cannot have silence, needs to have noise, excessive spending or wanting to spend, inability to complete tasks (putting clothes away), tries to write things down or it doesn't get done. She said she was diagnosed with ADHD as a kid.     Activity/Energy: almost always tired except for the outbursts of ADHD     Current psychosocial stressors:  medical issues and trauma family of origin issues, health issues, limited social support, mental " health symptoms, and occupational / vocational stress    Substance Use: No change. Current use includes: 1-2 puffs of vape marijuana per day     Suicidal Ideation: Denies suicidal ideation or self-harm    Homicidal Ideation: Denies homicidal ideation    Therapy: Referred to [facility]         Past Medical/Surgical History     Medical diagnoses:   Past Medical History:   Diagnosis Date    Depressive disorder 2020    First medical diagnosis, had symptoms from age 12    Hypothyroidism 12/7/2022     Surgical history: No past surgical history on file.         Medical Review of Systems     Allergies: Sertraline          Vital Signs: There were no vitals taken for this visit.          Weight: 0 lbs 0 oz  BMI: There is no height or weight on file to calculate BMI.    Physical Exam: Please refer to physical exam completed by primary care provider.    10 point review of systems is otherwise negative unless noted above.           Mental Status Examination     Appearance:  awake, alert, adequately groomed, and appeared as age stated  Alertness:  alert  and oriented  Attitude:  cooperative  Behavior/Demeanor:  pleasant and calm, with good  eye contact.  Mood:  good and was congruent to speech content.  Affect:  appropriate and in normal range, mood congruent, intensity is normal, and full range  Speech:  regular rate and rhythm  Psychomotor Behavior:  no evidence of tardive dyskinesia, dystonia, or tics and intact station, gait and muscle tone  Thought Process:  logical, linear, and goal oriented without any evidence of loose associations, flight of ideas, tangentiality, or circumstantiality.  Thought Content:  no evidence of suicidal ideation or homicidal ideation, no evidence of psychotic thought, no auditory hallucinations present, and no visual hallucinations present  Insight:  good  Judgment: good  Cognition:  time, person, and place  Attention Span and Concentration:  intact  Recent and Remote Memory:  intact  Language:   intact  Fund of Knowledge: appropriate  Muscle Strength and Tone: normal  Gait and Station: Normal     These cognitive functions grossly appear as described, but were not formally tested.    These cognitive functions grossly appear as described, but were not formally tested.         Labs     24 hours: No results found for this or any previous visit (from the past 24 hours).    Labs were reviewed, no concerns.         Medications                                                                  BOLD psych meds     I have reviewed this patient's current medications.    Current Outpatient Medications   Medication Sig Dispense Refill    erenumab-aooe (AIMOVIG) 70 MG/ML injection Inject 1 mL (70 mg) subcutaneously every 30 days. 3 mL 3    fluticasone (FLONASE) 50 MCG/ACT nasal spray Spray 2 sprays in nostril daily      galcanezumab-gnlm (EMGALITY) 120 MG/ML injection Inject 2 mLs (240 mg) subcutaneously once for 1 dose. 2 mL 0    galcanezumab-gnlm (EMGALITY) 120 MG/ML injection Inject 1 mL (120 mg) subcutaneously every 28 days. 4 mL 2    triamcinolone (KENALOG) 0.1 % external cream       ubrogepant (UBRELVY) 100 MG tablet Take 1 tablet (100 mg) by mouth at onset of headache (Take one tablet by mouth at onset of headache). 16 tablet 1    ubrogepant (UBRELVY) 50 MG tablet Take 1 tablet (50 mg) by mouth at onset of headache. 30 tablet 1    venlafaxine (EFFEXOR XR) 37.5 MG 24 hr capsule Take 1 capsule (37.5 mg) by mouth daily. (Patient taking differently: Take 75 mg by mouth daily.) 30 capsule 1     No current facility-administered medications for this visit.     Reviewed PDMP: N/A    Previous use of Psychotropics/Trials:  Lithium (helped, but toxic)  Zoloft, Lexapro  Quelbree     - Stays away from selective serotonin reuptake inhibitor's due to suicidal ideations          PHQ-9 / OSCAR-7                         Reviewed PHQ-9 and OSCAR-7 screenings.         10/2/2024     8:53 AM 10/8/2024    11:47 AM 11/8/2024     1:01 PM    PHQ-9 SCORE   PHQ-9 Total Score Samhart 24 (Severe depression) 22 (Severe depression) 16 (Moderately severe depression)   PHQ-9 Total Score 24 22 16        Patient-reported         10/2/2024     8:54 AM 11/8/2024     1:02 PM   OSCAR-7 SCORE   Total Score 18 (severe anxiety) 17 (severe anxiety)   Total Score 18 17        Patient-reported     Depression Screening Follow-up        11/8/2024     1:01 PM   PHQ   PHQ-9 Total Score 16    Q9: Thoughts of better off dead/self-harm past 2 weeks Not at all        Patient-reported     Does the patient currently have a mental health provider?  Yes, patient was referred back to current mental health provider.    DEANDRE Salvador CNP            39 minutes spent by me on the date of the encounter doing chart review, patient visit, and documentation     DEANDRE Caruso, PMHNP-BC    Patient was instructed to monitor for worsening symptoms and side effects from medications. If there is a serious deterioration of mood or any dangerous-type behaviors (suicidal/homicidal ideations) patient is advised to call 911 or report directly to the nearest Emergency Department.     Treatment Risk Statement: The risks, benefits, alternatives and potential adverse effects have been explained and are understood by the patient. The patient agrees to the treatment plan with the ability to do so. The patient knows to call the clinic for any problems or access emergency care if needed.

## 2024-11-12 ENCOUNTER — TELEPHONE (OUTPATIENT)
Dept: OTOLARYNGOLOGY | Facility: OTHER | Age: 24
End: 2024-11-12

## 2024-11-12 NOTE — CONFIDENTIAL NOTE
November 12, 2024    Ediwge Coello out of office. Left message for patient to return call to reschedule.    -Alem Gutierrez on 11/12/2024 at 8:22 AM

## 2024-11-14 ENCOUNTER — MYC MEDICAL ADVICE (OUTPATIENT)
Dept: FAMILY MEDICINE | Facility: OTHER | Age: 24
End: 2024-11-14

## 2024-11-14 ENCOUNTER — TELEPHONE (OUTPATIENT)
Dept: FAMILY MEDICINE | Facility: OTHER | Age: 24
End: 2024-11-14

## 2024-11-14 NOTE — LETTER
November 14, 2024      Vangie Granados  [address]  [place] [address]        To Whom It May Concern:    Vangie Granados  was seen on 11/05/2024.  Please excuse her until 11/06/2024 due to Migraine.        Sincerely,        iSlva Self MD

## 2024-11-14 NOTE — TELEPHONE ENCOUNTER
Received PA request from [facility] for galcanezumab-gnlm (EMGALITY) 120 MG/ML injection. Submitted on CMM, waiting for response.

## 2024-11-14 NOTE — TELEPHONE ENCOUNTER
Received PA request from [facility] for vilazodone (VIIBRYD) 10 MG TABS tablet. Submitted on CMM, waiting for response.

## 2024-11-14 NOTE — TELEPHONE ENCOUNTER
Received PA DENIAL for vilazodone (VIIBRYD) 10 MG TABS tablet. Scanned into EPIC, routed to provider.    Reasoning: OVER QTY LIMIT   Must provide med necessity letter to support request. They will approve up to Qty limit

## 2024-11-14 NOTE — TELEPHONE ENCOUNTER
Ok for note for date of appt. This can be printed and sent via Nevigo by RN. She should check her employer regarding need for FMLA etc for migraine days.

## 2024-11-15 ENCOUNTER — MYC MEDICAL ADVICE (OUTPATIENT)
Dept: PSYCHIATRY | Facility: OTHER | Age: 24
End: 2024-11-15

## 2024-11-19 NOTE — TELEPHONE ENCOUNTER
Received EUGENE IZQUIERDOIAL from [facility] for galcanezumab-gnlm (EMGALITY) 120 MG/ML injection. Scanned into EPIC, routed to provider.    Reasoning: Can't use 2 migraine drugs at the same time (See below)

## 2024-11-28 ENCOUNTER — MYC MEDICAL ADVICE (OUTPATIENT)
Dept: FAMILY MEDICINE | Facility: OTHER | Age: 24
End: 2024-11-28

## 2024-11-28 DIAGNOSIS — G43.909 MIGRAINE WITHOUT STATUS MIGRAINOSUS, NOT INTRACTABLE, UNSPECIFIED MIGRAINE TYPE: ICD-10-CM

## 2024-11-29 NOTE — TELEPHONE ENCOUNTER
I see the denial from 11/14, but it was not forwarded to me for review. Please make sure these are sent to me.     I will discuss with pharmacist as the reasoning states she cannot have two migraine medications

## 2024-11-29 NOTE — PROGRESS NOTES
Please see mychart message from patient.  PA denial received stating:  Received PA DENIAL from [facility] for galcanezumab-gnlm (EMGALITY) 120 MG/ML injection. Scanned into [employer], routed to provider.     Reasoning: Can't use 2 migraine drugs at the same time (See below)                             Mi Hawk, RN  Care Coordination

## 2024-12-02 NOTE — PROGRESS NOTES
"  Assessment & Plan     Chronic migraine with aura without status migrainosus, not intractable  Continue ubrelvy for abortive treatment. Start topamax as below  - ubrogepant (UBRELVY) 100 MG tablet  Dispense: 16 tablet; Refill: 1  - topiramate (TOPAMAX) 25 MG tablet  Dispense: 180 tablet; Refill: 0    Palpitations  With activity, no other red flag sx (pre syncope, chest pain, etc), but can be prolonged.   - ZIO PATCH 3-7 DAYS (additional cost to patient)  - ZIO PATCH 3-7 DAYS APPLICATION    Viral warts, unspecified type  Toe, discussed options. Pt will trial otc treatments, call if she wants cryotherapy here.     Atypical mole  Left hip area. Suspect this is benign, but irritated given the location and rubbing from clothing. Given the slight underlying erythema, irregularity of borders, recommend shave biopsy.     BMI  Estimated body mass index is 28.94 kg/m  as calculated from the following:    Height as of 11/5/24: 1.626 m (5' 4\").    Weight as of this encounter: 76.5 kg (168 lb 9.6 oz).     No follow-ups on file.    May Vences is a 24 year old, presenting for the following health issues:  Allergies and Headache        12/6/2024     1:05 PM   Additional Questions   Roomed by Art Mcclellan lpn   Accompanied by self     History of Present Illness       Headaches:   Since the patient's last clinic visit, headaches are: no change  The patient is getting headaches:  Every workday I get a migraine during work.  She is not able to do normal daily activities when she has a migraine.  The patient is taking the following rescue/relief medications:  Other   Patient states \"I get only a small amount of relief\" from the rescue/relief medications.   The patient is taking the following medications to prevent migraines:  No medications to prevent migraines  In the past 4 weeks, the patient has gone to an Urgent Care or Emergency Room 0 times times due to headaches.    Reason for visit:  Medication, allergy refferal, " autoimmune responses recently    She eats 0-1 servings of fruits and vegetables daily.She consumes 1 sweetened beverage(s) daily.She exercises with enough effort to increase her heart rate 30 to 60 minutes per day.  She exercises with enough effort to increase her heart rate 5 days per week.   She is taking medications regularly.       Migraine   Since your last clinic visit, how have your headaches changed?  Improved  How often are you getting headaches or migraines? Almost daily   Are you able to do normal daily activities when you have a migraine? No  Are you taking rescue/relief medications? (Select all that apply) Other: ubrelvy  How helpful is your rescue/relief medication?  I get some relief  Are you taking any medications to prevent migraines? (Select all that apply)  No  In the past 4 weeks, how often have you gone to urgent care or the emergency room because of your headaches?  0    Skin Lesion  Onset/Duration: chronic  Description  Location: left hip  Color: brown and pink  Border description: irregular border  Character: raised  Itching: no  Bleeding:  No  Intensity:  moderate  Progression of Symptoms:  waxing and waning  Accompanying signs and symptoms:   Bleeding: No  Scaling: No  Excessive sun exposure/tanning: No  Sunscreen used: N/A  History:           Any previous history of skin cancer: No  Any family history of melanoma: No  Previous episodes of similar lesion: No  Precipitating or alleviating factors: NA  Therapies tried and outcome: none    Warts  Onset/Duration: chronic  Description (location/number): single lesion 2nd toe right foot  Accompanying signs and symptoms (pain, redness):  no   History: prior warts: YES  Therapies tried and outcome: none    Concern - palpitations  Onset: months  Description: with activity, heart racing/pounding, can last a long time  Intensity: moderate  Progression of Symptoms:  intermittent  Accompanying Signs & Symptoms: no syncope, presyncope, chest  pain  Previous history of similar problem: no  Precipitating factors:        Worsened by: activity  Alleviating factors:        Improved by: rest  Therapies tried and outcome:  none       Review of Systems  Constitutional, neuro, ENT, endocrine, pulmonary, cardiac, gastrointestinal, genitourinary, musculoskeletal, integument and psychiatric systems are negative, except as otherwise noted.      Objective    BP 96/60 (BP Location: Left arm, Patient Position: Sitting, Cuff Size: Adult Regular)   Pulse 73   Temp 97.9  F (36.6  C)   Resp 16   Wt 76.5 kg (168 lb 9.6 oz)   SpO2 98%   BMI 28.94 kg/m    Body mass index is 28.94 kg/m .    Physical Exam   GENERAL: alert and no distress  EYES: Eyes grossly normal to inspection  NECK: no adenopathy, no asymmetry, masses, or scars  RESP: lungs clear to auscultation - no rales, rhonchi or wheezes  CV: regular rates and rhythm, normal S1 S2, no S3 or S4, no murmur, click or rub, and no peripheral edema  ABDOMEN: soft, nontender, no hepatosplenomegaly, no masses and bowel sounds normal  MS: no gross musculoskeletal defects noted, no edema  SKIN: wart over second toe, there is irregular tan mole left hip, slight underlying erythema  NEURO: Normal strength and tone, mentation intact and speech normal  PSYCH: mentation appears normal, affect normal/bright    No results found for any visits on 12/06/24.        Signed Electronically by: Silva Self MD

## 2024-12-06 ENCOUNTER — OFFICE VISIT (OUTPATIENT)
Dept: FAMILY MEDICINE | Facility: OTHER | Age: 24
End: 2024-12-06
Attending: FAMILY MEDICINE
Payer: COMMERCIAL

## 2024-12-06 VITALS
WEIGHT: 168.6 LBS | RESPIRATION RATE: 16 BRPM | TEMPERATURE: 97.9 F | HEART RATE: 73 BPM | OXYGEN SATURATION: 98 % | BODY MASS INDEX: 28.94 KG/M2 | DIASTOLIC BLOOD PRESSURE: 60 MMHG | SYSTOLIC BLOOD PRESSURE: 96 MMHG

## 2024-12-06 DIAGNOSIS — R00.2 PALPITATIONS: ICD-10-CM

## 2024-12-06 DIAGNOSIS — B07.9 VIRAL WARTS, UNSPECIFIED TYPE: ICD-10-CM

## 2024-12-06 DIAGNOSIS — D22.9 ATYPICAL MOLE: ICD-10-CM

## 2024-12-06 DIAGNOSIS — G43.E09 CHRONIC MIGRAINE WITH AURA WITHOUT STATUS MIGRAINOSUS, NOT INTRACTABLE: Primary | ICD-10-CM

## 2024-12-06 PROCEDURE — G0463 HOSPITAL OUTPT CLINIC VISIT: HCPCS

## 2024-12-06 RX ORDER — TOPIRAMATE 25 MG/1
25 TABLET, FILM COATED ORAL 2 TIMES DAILY
Qty: 180 TABLET | Refills: 0 | Status: SHIPPED | OUTPATIENT
Start: 2024-12-06

## 2024-12-06 ASSESSMENT — PAIN SCALES - GENERAL: PAINLEVEL_OUTOF10: MODERATE PAIN (4)

## 2024-12-09 ENCOUNTER — ALLIED HEALTH/NURSE VISIT (OUTPATIENT)
Dept: FAMILY MEDICINE | Facility: OTHER | Age: 24
End: 2024-12-09
Attending: FAMILY MEDICINE
Payer: COMMERCIAL

## 2024-12-09 ENCOUNTER — OFFICE VISIT (OUTPATIENT)
Dept: OTOLARYNGOLOGY | Facility: OTHER | Age: 24
End: 2024-12-09
Attending: NURSE PRACTITIONER
Payer: COMMERCIAL

## 2024-12-09 VITALS
TEMPERATURE: 98.3 F | RESPIRATION RATE: 16 BRPM | OXYGEN SATURATION: 98 % | WEIGHT: 168.65 LBS | SYSTOLIC BLOOD PRESSURE: 118 MMHG | HEIGHT: 64 IN | BODY MASS INDEX: 28.79 KG/M2 | DIASTOLIC BLOOD PRESSURE: 79 MMHG | HEART RATE: 81 BPM

## 2024-12-09 DIAGNOSIS — Z86.69 HISTORY OF MIGRAINE: ICD-10-CM

## 2024-12-09 DIAGNOSIS — J30.89 NON-SEASONAL ALLERGIC RHINITIS, UNSPECIFIED TRIGGER: Primary | ICD-10-CM

## 2024-12-09 DIAGNOSIS — R00.2 PALPITATIONS: ICD-10-CM

## 2024-12-09 DIAGNOSIS — K90.49 FOOD INTOLERANCE IN ADULT: ICD-10-CM

## 2024-12-09 PROCEDURE — 36415 COLL VENOUS BLD VENIPUNCTURE: CPT | Mod: ZL | Performed by: NURSE PRACTITIONER

## 2024-12-09 PROCEDURE — G0463 HOSPITAL OUTPT CLINIC VISIT: HCPCS

## 2024-12-09 PROCEDURE — 93242 EXT ECG>48HR<7D RECORDING: CPT

## 2024-12-09 ASSESSMENT — PAIN SCALES - GENERAL: PAINLEVEL_OUTOF10: NO PAIN (0)

## 2024-12-09 NOTE — PATIENT INSTRUCTIONS
Follow-up for skin testing. Our allergy nurse will call you to schedule.   Complete food panel today  Nery Gibbons will look into lactose-intolerance testing for you.    Recommend food diary  Tolerates sugar    Thank you for allowing Lissett Coello and our ENT team to participate in your care.  If your medications are too expensive, please give the nurse a call.  We can possibly change this medication.  If you have a scheduling or an appointment question please contact our Health Unit Coordinator at their direct line [phone] ext [phone].   ALL nursing questions or concerns can be directed to your ENT nurse at: [phone] - [place]

## 2024-12-09 NOTE — Clinical Note
Please call patient, I will defer lactose intolerance testing to PCP or GI, discussed with our clinic lab and this is not routinely completed at our facility.  Thanks JS

## 2024-12-09 NOTE — Clinical Note
Vangie Allan was concerned for lactose intolerance, I looked into hydrogen breath testing in our clinic lab and they stated it is not routinely completed here.  I will defer that testing to you or GI if she ends up going there for her GI issues.  Thanks Edwige

## 2024-12-09 NOTE — PROGRESS NOTES
Vangie Granados arrived here on 12/9/2024 1:17 PM for 3-7 Days  Zio monitor placement per ordering provider Clair for the diagnosis Palpitations.  Patient s skin was prepped per protocol. Dr. Lovell is the supervising MD.  Zio monitor was placed.  Instructions were reviewed with and given to the patient.  Patient verbalized understanding of wear, troubleshooting and monitor return instructions.

## 2024-12-09 NOTE — LETTER
12/9/2024      Vangie Granados  [id number]   [place] [address]      Dear Colleague,    Thank you for referring your patient, Vangie Granados, to the [facility]. Please see a copy of my visit note below.    Otolaryngology Note         Chief Complaint:     Patient presents with:  Consult: allergy consult           History of Present Illness:     Vangie Granados is a 24 year old female seen today for concerns for seasonal allergies and multiple food intolerances.    Known history of seasonal allergic rhinitis with previous allergy testing completed 6 years ago that was positive for dog and ragweed.  Testing was completed at an outside facility and records are not available, positives were per patient's recall.  She does report history of immunotherapy for less than a year in [place] in the past.  Reports history of multiple food intolerances.  MSG, certain sauces and spicy foods, dairy products, walnuts, apples (although is able to eat red delicious apples) and certain sugars causes gut cramping and diarrhea.  She reports she seems to do better with organic products.  No obvious anaphylaxis or oral swelling facial swelling with foods.    History of migraines, working on getting preapproved for Emgality at this point.  She did start taking Topamax for dual therapy for bipolar and migraines.  She uses Ubrelvy for abortive measures.    She is interested in food allergy testing     Allergy symptoms include itching, conjunctivitis, rhinitis, skin rash, reactive airway, cough.  Symptoms seem to be worsened with exposure to cats, dogs, dust.  She also has intolerance of certain fragrances.  History of sensitive skin    Symptoms have been present for many years and are worsening over time.  She is attempted pretreating with Flonase or nasal steroid prior to known exposures, also takes Claritin infrequently.    No recurrent sinusitis.  She takes steroid as needed for known exposure  Vapes THC  at times and gets wheezy with vaping    Never been tested for asthma  - trouble with exercise induced wheezing.   Does not currently have an inhaler  She has been having increased heart rate, wheezing, etc with activity    Concerned for lactose intolerance - stomach cramps with certain milk/cream exposure  No dysphagia  She gets phlegm with eating certain foods. Coughing after eating.    She denies heartburn but she does get some chest tightness at times with exertion  + family history of allergies      Resides in a house with a basement. There is no water or mold.  There is carpet in the bedroom.  vacuums regularly  Pets: mostly outside dog    MRI brain with and without contrast completed 10/14/2024, negative for abnormalities.  Visualized paranasal sinuses demonstrate no abnormal fluid.         Medications:     Current Outpatient Rx   Medication Sig Dispense Refill     galcanezumab-gnlm (EMGALITY) 120 MG/ML injection Inject 1 mL (120 mg) subcutaneously every 28 days. 4 mL 2     topiramate (TOPAMAX) 25 MG tablet Take 1 tablet (25 mg) by mouth 2 times daily. 180 tablet 0     ubrogepant (UBRELVY) 100 MG tablet Take 1 tablet (100 mg) by mouth at onset of headache (Take one tablet by mouth at onset of headache). 16 tablet 1     fluticasone (FLONASE) 50 MCG/ACT nasal spray Spray 2 sprays in nostril daily (Patient not taking: Reported on 12/9/2024)       venlafaxine (EFFEXOR XR) 37.5 MG 24 hr capsule Take 1 capsule (37.5 mg) by mouth daily. (Patient not taking: Reported on 12/9/2024) 30 capsule 1            Allergies:     Allergies: Sertraline          Past Medical History:     Past Medical History:   Diagnosis Date     Depressive disorder 2020    First medical diagnosis, had symptoms from age 12     Hypothyroidism 12/7/2022            Past Surgical History:     No past surgical history on file.    ENT family history reviewed         Social History:     Social History     Tobacco Use     Smoking status: Never      "Smokeless tobacco: Never   Vaping Use     Vaping status: Never Used   Substance Use Topics     Alcohol use: Yes     Comment: Two beers or one mixed liquor drink every other week.     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            Review of Systems:     ROS: See HPI         Physical Exam:     /79 (BP Location: Right arm, Patient Position: Sitting, Cuff Size: Adult Regular)   Pulse 81   Temp 98.3  F (36.8  C) (Tympanic)   Resp 16   Ht 1.626 m (5' 4.02\")   Wt 76.5 kg (168 lb 10.4 oz)   SpO2 98%   BMI 28.93 kg/m      General - The patient is well nourished and well developed, and appears to have good nutritional status.    Head and Face - Normocephalic and atraumatic, with no gross asymmetry noted.  The facial nerve is intact, with strong symmetric movements.  Voice and Breathing - The patient was breathing comfortably without the use of accessory muscles. There was no wheezing, stridor, or stertor.  The patients voice was  clear and strong, and had appropriate pitch and quality.  Ears -The external auditory canals are patent, the tympanic membranes are intact without effusion, retraction or mass.  Bony landmarks are intact.  Eyes - Extraocular movements intact, sclera were not icteric or injected, conjunctiva were pink and moist.  Mouth - Examination of the oral cavity showed pink, healthy oral mucosa. No lesions or ulcerations noted.  The tongue was mobile and midline, and the dentition were in good condition.    Throat - The walls of the oropharynx were smooth, pink, moist, symmetric, and had no lesions or ulcerations.  The tonsillar pillars and soft palate were symmetric.  The uvula was midline on elevation.   Neck -no worrisome lymphadenopathy.   Palpation of the thyroid was soft and smooth, with no nodules or goiter appreciated.  The trachea was mobile and midline.  Nose - External contour is symmetric, no gross deflection or scars.  " The nasal passages are examined with nasal speculum.   Nasal mucosa is pink and moist with no abnormal mucus.  The septum was intact and the turbinates are examined with nasal speculum, no polyps, masses, or purulence noted on examination of anterior nasal cavity.          Assessment and Plan:       ICD-10-CM    1. Non-seasonal allergic rhinitis, unspecified trigger  J30.89       2. History of migraine  Z86.69       3. Food intolerance in adult  K90.49         She is interested in allergy testing, we will have her follow-up for M QT.  Will complete a food panel today.  Recommend starting a food and headache diary.  At this time I will defer testing for lactose intolerance to primary care or GI.  I did call our clinic laboratory and this is not routinely completed at our facility.  We will call you with results and further recommendations and interventions per results.    Lissett ORDONEZ  [facility]      Again, thank you for allowing me to participate in the care of your patient.        Sincerely,        Lissett Coello NP

## 2024-12-09 NOTE — PROGRESS NOTES
Otolaryngology Note         Chief Complaint:     Patient presents with:  Consult: allergy consult           History of Present Illness:     Vangie Granados is a 24 year old female seen today for concerns for seasonal allergies and multiple food intolerances.    Known history of seasonal allergic rhinitis with previous allergy testing completed 6 years ago that was positive for dog and ragweed.  Testing was completed at an outside facility and records are not available, positives were per patient's recall.  She does report history of immunotherapy for less than a year in [place] in the past.  Reports history of multiple food intolerances.  MSG, certain sauces and spicy foods, dairy products, walnuts, apples (although is able to eat red delicious apples) and certain sugars causes gut cramping and diarrhea.  She reports she seems to do better with organic products.  No obvious anaphylaxis or oral swelling facial swelling with foods.    History of migraines, working on getting preapproved for Emgality at this point.  She did start taking Topamax for dual therapy for bipolar and migraines.  She uses Ubrelvy for abortive measures.    She is interested in food allergy testing     Allergy symptoms include itching, conjunctivitis, rhinitis, skin rash, reactive airway, cough.  Symptoms seem to be worsened with exposure to cats, dogs, dust.  She also has intolerance of certain fragrances.  History of sensitive skin    Symptoms have been present for many years and are worsening over time.  She is attempted pretreating with Flonase or nasal steroid prior to known exposures, also takes Claritin infrequently.    No recurrent sinusitis.  She takes steroid as needed for known exposure  Vapes THC at times and gets wheezy with vaping    Never been tested for asthma  - trouble with exercise induced wheezing.   Does not currently have an inhaler  She has been having increased heart rate, wheezing, etc with activity    Concerned for  lactose intolerance - stomach cramps with certain milk/cream exposure  No dysphagia  She gets phlegm with eating certain foods. Coughing after eating.    She denies heartburn but she does get some chest tightness at times with exertion  + family history of allergies      Resides in a house with a basement. There is no water or mold.  There is carpet in the bedroom.  vacuums regularly  Pets: mostly outside dog    MRI brain with and without contrast completed 10/14/2024, negative for abnormalities.  Visualized paranasal sinuses demonstrate no abnormal fluid.         Medications:     Current Outpatient Rx   Medication Sig Dispense Refill    galcanezumab-gnlm (EMGALITY) 120 MG/ML injection Inject 1 mL (120 mg) subcutaneously every 28 days. 4 mL 2    topiramate (TOPAMAX) 25 MG tablet Take 1 tablet (25 mg) by mouth 2 times daily. 180 tablet 0    ubrogepant (UBRELVY) 100 MG tablet Take 1 tablet (100 mg) by mouth at onset of headache (Take one tablet by mouth at onset of headache). 16 tablet 1    fluticasone (FLONASE) 50 MCG/ACT nasal spray Spray 2 sprays in nostril daily (Patient not taking: Reported on 12/9/2024)      venlafaxine (EFFEXOR XR) 37.5 MG 24 hr capsule Take 1 capsule (37.5 mg) by mouth daily. (Patient not taking: Reported on 12/9/2024) 30 capsule 1            Allergies:     Allergies: Sertraline          Past Medical History:     Past Medical History:   Diagnosis Date    Depressive disorder 2020    First medical diagnosis, had symptoms from age 12    Hypothyroidism 12/7/2022            Past Surgical History:     No past surgical history on file.    ENT family history reviewed         Social History:     Social History     Tobacco Use    Smoking status: Never    Smokeless tobacco: Never   Vaping Use    Vaping status: Never Used   Substance Use Topics    Alcohol use: Yes     Comment: Two beers or one mixed liquor drink every other week.    Drug use: Not Currently     Types: Marijuana     Comment: Was addicted  "due to pain, quit 2 months ago.            Review of Systems:     ROS: See HPI         Physical Exam:     /79 (BP Location: Right arm, Patient Position: Sitting, Cuff Size: Adult Regular)   Pulse 81   Temp 98.3  F (36.8  C) (Tympanic)   Resp 16   Ht 1.626 m (5' 4.02\")   Wt 76.5 kg (168 lb 10.4 oz)   SpO2 98%   BMI 28.93 kg/m      General - The patient is well nourished and well developed, and appears to have good nutritional status.    Head and Face - Normocephalic and atraumatic, with no gross asymmetry noted.  The facial nerve is intact, with strong symmetric movements.  Voice and Breathing - The patient was breathing comfortably without the use of accessory muscles. There was no wheezing, stridor, or stertor.  The patients voice was  clear and strong, and had appropriate pitch and quality.  Ears -The external auditory canals are patent, the tympanic membranes are intact without effusion, retraction or mass.  Bony landmarks are intact.  Eyes - Extraocular movements intact, sclera were not icteric or injected, conjunctiva were pink and moist.  Mouth - Examination of the oral cavity showed pink, healthy oral mucosa. No lesions or ulcerations noted.  The tongue was mobile and midline, and the dentition were in good condition.    Throat - The walls of the oropharynx were smooth, pink, moist, symmetric, and had no lesions or ulcerations.  The tonsillar pillars and soft palate were symmetric.  The uvula was midline on elevation.   Neck -no worrisome lymphadenopathy.   Palpation of the thyroid was soft and smooth, with no nodules or goiter appreciated.  The trachea was mobile and midline.  Nose - External contour is symmetric, no gross deflection or scars.  The nasal passages are examined with nasal speculum.   Nasal mucosa is pink and moist with no abnormal mucus.  The septum was intact and the turbinates are examined with nasal speculum, no polyps, masses, or purulence noted on examination of anterior " nasal cavity.          Assessment and Plan:       ICD-10-CM    1. Non-seasonal allergic rhinitis, unspecified trigger  J30.89       2. History of migraine  Z86.69       3. Food intolerance in adult  K90.49         She is interested in allergy testing, we will have her follow-up for M QT.  Will complete a food panel today.  Recommend starting a food and headache diary.  At this time I will defer testing for lactose intolerance to primary care or GI.  I did call our clinic laboratory and this is not routinely completed at our facility.  We will call you with results and further recommendations and interventions per results.    Lissett TINEOC  [facility]

## 2024-12-10 ENCOUNTER — TELEPHONE (OUTPATIENT)
Dept: ALLERGY | Facility: OTHER | Age: 24
End: 2024-12-10

## 2024-12-10 NOTE — TELEPHONE ENCOUNTER
Call to patient to schedule allergy testing.   Went over instructions with patient for allergy skin testing.  Reviewed patients current medications and patient will avoid all contraindicated medications prior to MQT testing.  Patient verbalizes understanding.  Copy of allergy testing packet will be mailed to the patient.  Patient is aware that she is scheduled 2/20/2025. She is advised to call if she has any questions.

## 2024-12-14 LAB
ALMOND IGE QN: <0.1 KU(A)/L
CASHEW NUT IGE QN: <0.1 KU(A)/L
CODFISH IGE QN: <0.1 KU(A)/L
COW MILK IGE QN: <0.1 KU(A)/L
EGG WHITE IGE QN: <0.1 KU(A)/L
HAZELNUT IGE QN: <0.1 KU(A)/L
IGE SERPL-ACNC: 32 KU/L (ref 0–114)
PEANUT IGE QN: <0.1 KU(A)/L
SALMON IGE QN: <0.1 KU(A)/L
SCALLOP IGE QN: <0.1 KU(A)/L
SESAME SEED IGE QN: <0.1 KU(A)/L
SHRIMP IGE QN: <0.1 KU(A)/L
SOYBEAN IGE QN: <0.1 KU(A)/L
TUNA IGE QN: <0.1 KU(A)/L
WALNUT IGE QN: <0.1 KU(A)/L
WHEAT IGE QN: <0.1 KU(A)/L

## 2024-12-30 ENCOUNTER — HOSPITAL ENCOUNTER (EMERGENCY)
Facility: HOSPITAL | Age: 24
Discharge: HOME | End: 2024-12-30
Attending: PHYSICIAN ASSISTANT
Payer: COMMERCIAL

## 2024-12-30 VITALS
BODY MASS INDEX: 27.54 KG/M2 | DIASTOLIC BLOOD PRESSURE: 80 MMHG | SYSTOLIC BLOOD PRESSURE: 114 MMHG | HEART RATE: 68 BPM | RESPIRATION RATE: 16 BRPM | WEIGHT: 160.5 LBS | OXYGEN SATURATION: 98 % | TEMPERATURE: 98.2 F

## 2024-12-30 DIAGNOSIS — R42 POSTURAL DIZZINESS WITH NEAR SYNCOPE: Primary | ICD-10-CM

## 2024-12-30 DIAGNOSIS — R55 POSTURAL DIZZINESS WITH NEAR SYNCOPE: Primary | ICD-10-CM

## 2024-12-30 LAB
ALBUMIN UR-MCNC: NEGATIVE MG/DL
AMORPH CRY #/AREA URNS HPF: ABNORMAL /HPF
ANION GAP SERPL CALCULATED.3IONS-SCNC: 12 MMOL/L (ref 7–15)
APPEARANCE UR: CLEAR
BACTERIA #/AREA URNS HPF: ABNORMAL /HPF
BASOPHILS # BLD AUTO: 0 10E3/UL (ref 0–0.2)
BASOPHILS NFR BLD AUTO: 1 %
BILIRUB UR QL STRIP: NEGATIVE
BUN SERPL-MCNC: 6.4 MG/DL (ref 6–20)
CALCIUM SERPL-MCNC: 9.3 MG/DL (ref 8.8–10.4)
CHLORIDE SERPL-SCNC: 108 MMOL/L (ref 98–107)
COLOR UR AUTO: ABNORMAL
CREAT SERPL-MCNC: 0.93 MG/DL (ref 0.51–0.95)
D DIMER PPP FEU-MCNC: <0.3 UG/ML FEU (ref 0–0.5)
EGFRCR SERPLBLD CKD-EPI 2021: 88 ML/MIN/1.73M2
EOSINOPHIL # BLD AUTO: 0.2 10E3/UL (ref 0–0.7)
EOSINOPHIL NFR BLD AUTO: 4 %
ERYTHROCYTE [DISTWIDTH] IN BLOOD BY AUTOMATED COUNT: 12.5 % (ref 10–15)
GLUCOSE SERPL-MCNC: 92 MG/DL (ref 70–99)
GLUCOSE UR STRIP-MCNC: NEGATIVE MG/DL
HCG UR QL: NEGATIVE
HCO3 SERPL-SCNC: 20 MMOL/L (ref 22–29)
HCT VFR BLD AUTO: 41.7 % (ref 35–47)
HGB BLD-MCNC: 14.4 G/DL (ref 11.7–15.7)
HGB UR QL STRIP: ABNORMAL
HOLD SPECIMEN: NORMAL
HOLD SPECIMEN: NORMAL
IMM GRANULOCYTES # BLD: 0 10E3/UL
IMM GRANULOCYTES NFR BLD: 0 %
KETONES UR STRIP-MCNC: NEGATIVE MG/DL
LEUKOCYTE ESTERASE UR QL STRIP: NEGATIVE
LIPASE SERPL-CCNC: 37 U/L (ref 13–60)
LYMPHOCYTES # BLD AUTO: 1.3 10E3/UL (ref 0.8–5.3)
LYMPHOCYTES NFR BLD AUTO: 29 %
MCH RBC QN AUTO: 29.4 PG (ref 26.5–33)
MCHC RBC AUTO-ENTMCNC: 34.5 G/DL (ref 31.5–36.5)
MCV RBC AUTO: 85 FL (ref 78–100)
MONOCYTES # BLD AUTO: 0.4 10E3/UL (ref 0–1.3)
MONOCYTES NFR BLD AUTO: 10 %
NEUTROPHILS # BLD AUTO: 2.5 10E3/UL (ref 1.6–8.3)
NEUTROPHILS NFR BLD AUTO: 57 %
NITRATE UR QL: NEGATIVE
NRBC # BLD AUTO: 0 10E3/UL
NRBC BLD AUTO-RTO: 0 /100
PH UR STRIP: 7 [PH] (ref 4.7–8)
PLATELET # BLD AUTO: 315 10E3/UL (ref 150–450)
POTASSIUM SERPL-SCNC: 3.7 MMOL/L (ref 3.4–5.3)
RBC # BLD AUTO: 4.9 10E6/UL (ref 3.8–5.2)
RBC URINE: 22 /HPF
SODIUM SERPL-SCNC: 140 MMOL/L (ref 135–145)
SP GR UR STRIP: 1.01 (ref 1–1.03)
SQUAMOUS EPITHELIAL: 5 /HPF
UROBILINOGEN UR STRIP-MCNC: NORMAL MG/DL
WBC # BLD AUTO: 4.4 10E3/UL (ref 4–11)
WBC URINE: 1 /HPF

## 2024-12-30 PROCEDURE — 80048 BASIC METABOLIC PNL TOTAL CA: CPT | Performed by: PHYSICIAN ASSISTANT

## 2024-12-30 PROCEDURE — 85004 AUTOMATED DIFF WBC COUNT: CPT | Performed by: PHYSICIAN ASSISTANT

## 2024-12-30 PROCEDURE — 99284 EMERGENCY DEPT VISIT MOD MDM: CPT | Performed by: PHYSICIAN ASSISTANT

## 2024-12-30 PROCEDURE — 81025 URINE PREGNANCY TEST: CPT | Performed by: PHYSICIAN ASSISTANT

## 2024-12-30 PROCEDURE — 36415 COLL VENOUS BLD VENIPUNCTURE: CPT | Performed by: PHYSICIAN ASSISTANT

## 2024-12-30 PROCEDURE — 93005 ELECTROCARDIOGRAM TRACING: CPT

## 2024-12-30 PROCEDURE — 258N000003 HC RX IP 258 OP 636: Performed by: PHYSICIAN ASSISTANT

## 2024-12-30 PROCEDURE — 99284 EMERGENCY DEPT VISIT MOD MDM: CPT | Mod: 25

## 2024-12-30 PROCEDURE — 93010 ELECTROCARDIOGRAM REPORT: CPT | Performed by: INTERNAL MEDICINE

## 2024-12-30 PROCEDURE — 81003 URINALYSIS AUTO W/O SCOPE: CPT | Performed by: PHYSICIAN ASSISTANT

## 2024-12-30 PROCEDURE — 85379 FIBRIN DEGRADATION QUANT: CPT | Performed by: PHYSICIAN ASSISTANT

## 2024-12-30 PROCEDURE — 83690 ASSAY OF LIPASE: CPT | Performed by: PHYSICIAN ASSISTANT

## 2024-12-30 PROCEDURE — 82310 ASSAY OF CALCIUM: CPT | Performed by: PHYSICIAN ASSISTANT

## 2024-12-30 PROCEDURE — 96360 HYDRATION IV INFUSION INIT: CPT

## 2024-12-30 RX ADMIN — SODIUM CHLORIDE 1000 ML: 9 INJECTION, SOLUTION INTRAVENOUS at 18:04

## 2024-12-30 ASSESSMENT — ENCOUNTER SYMPTOMS
SHORTNESS OF BREATH: 0
ACTIVITY CHANGE: 1
PALPITATIONS: 0
FEVER: 0
ROS GI COMMENTS: CHRONIC ABDOMINAL PAIN
COUGH: 0

## 2024-12-30 ASSESSMENT — COLUMBIA-SUICIDE SEVERITY RATING SCALE - C-SSRS
2. HAVE YOU ACTUALLY HAD ANY THOUGHTS OF KILLING YOURSELF IN THE PAST MONTH?: NO
1. IN THE PAST MONTH, HAVE YOU WISHED YOU WERE DEAD OR WISHED YOU COULD GO TO SLEEP AND NOT WAKE UP?: NO
6. HAVE YOU EVER DONE ANYTHING, STARTED TO DO ANYTHING, OR PREPARED TO DO ANYTHING TO END YOUR LIFE?: NO

## 2024-12-30 ASSESSMENT — ACTIVITIES OF DAILY LIVING (ADL)
ADLS_ACUITY_SCORE: 41

## 2024-12-30 NOTE — ED NOTES
Patient has been having mid to heavy bleeding for the past 6 days. She usually has light spotting due to her birth control.

## 2024-12-30 NOTE — ED TRIAGE NOTES
Pt presents with c/o abd pain, dizziness, and vaginal bleeding that has been going on for the past week. Pt switched birth control approximately 2 months ago.

## 2024-12-30 NOTE — ED PROVIDER NOTES
History     Chief Complaint   Patient presents with    Dizziness     The history is provided by the patient.     Vangie Granados is a 24 year old female who presented to the emergency department ambulatory along with significant other for evaluation of a multitude of vague complaints.  Patient reports that she has been having issues with lightheadedness and a feeling of presyncope with position change and ambulation recently.  She has chronic intermittent abdominal pain.  She denies any chest pains or shortness of breath.  Denies any recent travel.  Denies any significant medication changes.  No recent falls.  Does report that her most recent menstrual cycle has been more significant than previous.  She reports chronic nausea.    Allergies:  Allergies   Allergen Reactions    Sertraline Other (See Comments)     Suicidal ideation       Problem List:    Patient Active Problem List    Diagnosis Date Noted    Migraine, unspecified, not intractable, without status migrainosus 01/21/2024     Priority: Medium    Bipolar affective disorder, currently depressed, moderate (H) 10/25/2023     Priority: Medium    PTSD (post-traumatic stress disorder) 10/25/2023     Priority: Medium    Hypothyroidism 12/07/2022     Priority: Medium    Vitamin B12 deficiency (non anemic) 12/07/2022     Priority: Medium    Vitamin D deficiency 11/15/2022     Priority: Medium    Intracranial aneurysm 11/06/2022     Priority: Medium    Family history of aneurysm of blood vessel of brain 10/04/2022     Priority: Medium        Past Medical History:    Past Medical History:   Diagnosis Date    Depressive disorder 2020    Hypothyroidism 12/7/2022       Past Surgical History:    No past surgical history on file.    Family History:    No family history on file.    Social History:  Marital Status:   [4]  Social History     Tobacco Use    Smoking status: Never    Smokeless tobacco: Never   Vaping Use    Vaping status: Never Used   Substance Use  Topics    Alcohol use: Yes     Comment: Two beers or one mixed liquor drink every other week.    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        Medications:    fluticasone (FLONASE) 50 MCG/ACT nasal spray  topiramate (TOPAMAX) 25 MG tablet  ubrogepant (UBRELVY) 100 MG tablet  galcanezumab-gnlm (EMGALITY) 120 MG/ML injection  venlafaxine (EFFEXOR XR) 37.5 MG 24 hr capsule          Review of Systems   Constitutional:  Positive for activity change. Negative for fever.   Respiratory:  Negative for cough and shortness of breath.    Cardiovascular:  Negative for chest pain and palpitations.   Gastrointestinal:         Chronic abdominal pain   Genitourinary:  Positive for vaginal bleeding.   Neurological:         See HPI.  The patient endorses symptoms only with position change and ambulation       Physical Exam   BP: 129/86  Pulse: 63  Temp: 98.2  F (36.8  C)  Resp: 16  Weight: 72.8 kg (160 lb 7.9 oz)  SpO2: 99 %      Physical Exam  Vitals and nursing note reviewed.   Constitutional:       General: She is not in acute distress.     Appearance: Normal appearance. She is normal weight. She is not ill-appearing, toxic-appearing or diaphoretic.      Comments: Pleasant and talkative 24-year-old female found semireclined in no distress   Cardiovascular:      Rate and Rhythm: Normal rate and regular rhythm.   Pulmonary:      Effort: Pulmonary effort is normal.      Breath sounds: Normal breath sounds.   Abdominal:      Palpations: Abdomen is soft.      Tenderness: There is no abdominal tenderness.   Skin:     General: Skin is warm and dry.      Capillary Refill: Capillary refill takes less than 2 seconds.   Neurological:      General: No focal deficit present.      Mental Status: She is alert and oriented to person, place, and time.         ED Course     ED Course as of 12/30/24 1853   Mon Dec 30, 2024   0066 My independent review of the EKG shows sinus bradycardia  at a rate of 57.  Normal ND interval.  Normal QRS duration.  Normal QTc.  Normal axis.  There are no concerning ST segments.  There are no concerning T waves.   1755 Hemoglobin: 14.4   1757 Broad differential considered includes but is not limited to pulmonary embolism, infectious etiology, electrolyte abnormality, anemia, pregnancy related symptoms.   1830 D-Dimer Quantitative: <0.30     Procedures              Critical Care time:  none              Results for orders placed or performed during the hospital encounter of 12/30/24 (from the past 24 hours)   CBC with platelets differential    Narrative    The following orders were created for panel order CBC with platelets differential.  Procedure                               Abnormality         Status                     ---------                               -----------         ------                     CBC with platelets and d...[[id number]]                      Final result                 Please view results for these tests on the individual orders.   Basic metabolic panel   Result Value Ref Range    Sodium 140 135 - 145 mmol/L    Potassium 3.7 3.4 - 5.3 mmol/L    Chloride 108 (H) 98 - 107 mmol/L    Carbon Dioxide (CO2) 20 (L) 22 - 29 mmol/L    Anion Gap 12 7 - 15 mmol/L    Urea Nitrogen 6.4 6.0 - 20.0 mg/dL    Creatinine 0.93 0.51 - 0.95 mg/dL    GFR Estimate 88 >60 mL/min/1.73m2    Calcium 9.3 8.8 - 10.4 mg/dL    Glucose 92 70 - 99 mg/dL   CBC with platelets and differential   Result Value Ref Range    WBC Count 4.4 4.0 - 11.0 10e3/uL    RBC Count 4.90 3.80 - 5.20 10e6/uL    Hemoglobin 14.4 11.7 - 15.7 g/dL    Hematocrit 41.7 35.0 - 47.0 %    MCV 85 78 - 100 fL    MCH 29.4 26.5 - 33.0 pg    MCHC 34.5 31.5 - 36.5 g/dL    RDW 12.5 10.0 - 15.0 %    Platelet Count 315 150 - 450 10e3/uL    % Neutrophils 57 %    % Lymphocytes 29 %    % Monocytes 10 %    % Eosinophils 4 %    % Basophils 1 %    % Immature Granulocytes 0 %    NRBCs per 100 WBC 0 <1 /100    Absolute  Neutrophils 2.5 1.6 - 8.3 10e3/uL    Absolute Lymphocytes 1.3 0.8 - 5.3 10e3/uL    Absolute Monocytes 0.4 0.0 - 1.3 10e3/uL    Absolute Eosinophils 0.2 0.0 - 0.7 10e3/uL    Absolute Basophils 0.0 0.0 - 0.2 10e3/uL    Absolute Immature Granulocytes 0.0 <=0.4 10e3/uL    Absolute NRBCs 0.0 10e3/uL   Extra Tube    Narrative    The following orders were created for panel order Extra Tube.  Procedure                               Abnormality         Status                     ---------                               -----------         ------                     Extra Blue Top Tube[[id number]]                              Final result               Extra Red Top Tube[[id number]]                               Final result                 Please view results for these tests on the individual orders.   Extra Blue Top Tube   Result Value Ref Range    Hold Specimen JIC    Extra Red Top Tube   Result Value Ref Range    Hold Specimen JIC    Lipase   Result Value Ref Range    Lipase 37 13 - 60 U/L   D dimer quantitative   Result Value Ref Range    D-Dimer Quantitative <0.30 0.00 - 0.50 ug/mL FEU    Narrative    This D-dimer assay is intended for use in conjunction with a clinical pretest probability assessment model to exclude pulmonary embolism (PE) and deep venous thrombosis (DVT) in outpatients suspected of PE or DVT. The cut-off value is 0.50 ug/mL FEU.   HCG qualitative urine (UPT)   Result Value Ref Range    hCG Urine Qualitative Negative Negative   UA with Microscopic reflex to Culture    Specimen: Urine, Midstream   Result Value Ref Range    Color Urine Straw Colorless, Straw, Light Yellow, Yellow    Appearance Urine Clear Clear    Glucose Urine Negative Negative mg/dL    Bilirubin Urine Negative Negative    Ketones Urine Negative Negative mg/dL    Specific Gravity Urine 1.007 1.003 - 1.035    Blood Urine Large (A) Negative    pH Urine 7.0 4.7 - 8.0    Protein Albumin Urine Negative Negative mg/dL    Urobilinogen Urine  Normal Normal, 2.0 mg/dL    Nitrite Urine Negative Negative    Leukocyte Esterase Urine Negative Negative    Bacteria Urine Few (A) None Seen /HPF    Amorphous Crystals Urine Few (A) None Seen /HPF    RBC Urine 22 (H) <=2 /HPF    WBC Urine 1 <=5 /HPF    Squamous Epithelials Urine 5 (H) <=1 /HPF    Narrative    Urine Culture not indicated   EKG 12-lead, tracing only   Result Value Ref Range    Systolic Blood Pressure  mmHg    Diastolic Blood Pressure  mmHg    Ventricular Rate 57 BPM    Atrial Rate 57 BPM    TX Interval 120 ms    QRS Duration 78 ms     ms    QTc 401 ms    P Axis 39 degrees    R AXIS 84 degrees    T Axis 24 degrees    Interpretation ECG       Sinus bradycardia with sinus arrhythmia  Otherwise normal ECG  No previous ECGs available         Medications   sodium chloride 0.9% BOLUS 1,000 mL (0 mLs Intravenous Stopped 12/30/24 1853)       Assessments & Plan (with Medical Decision Making)   Broad differential considered in the emergency department with broad workup initiated to include serum testing and EKG.  The patient was hydrated.  She looks well.  Her vital signs are normal.  She has no concerning findings on her basic laboratory evaluation.  At this point there is no reasonable or compelling medical indication for further emergent workup.  Close clinic follow-up was discussed.  Strict return precautions provided.  Outpatient echocardiogram was ordered.  The patient and her significant other voiced complete understanding and had no further questions for me.  Oxygen saturations are 100% on room air.    There is no indication for further investigation or treatment on an emergent basis.  There is no reasonably foreseeable injury that would be associated with discharge and close outpatient follow-up.      This document was prepared using a combination of typing and voice generated software.  While every attempt was made for accuracy, spelling and grammatical errors may exist.     I have reviewed the  nursing notes.    I have reviewed the findings, diagnosis, plan and need for follow up with the patient.           Medical Decision Making  The patient's presentation was of moderate complexity (an undiagnosed new problem with uncertain prognosis).    The patient's evaluation involved:  ordering and/or review of 3+ test(s) in this encounter (labs and EKG)    The patient's management necessitated moderate risk (prescription drug management including medications given in the ED).        New Prescriptions    No medications on file       Final diagnoses:   Postural dizziness with near syncope       12/30/2024   [facility]       Andrés North PA-C  12/30/24 [id number]

## 2024-12-31 ENCOUNTER — TELEPHONE (OUTPATIENT)
Dept: FAMILY MEDICINE | Facility: OTHER | Age: 24
End: 2024-12-31

## 2024-12-31 NOTE — DISCHARGE INSTRUCTIONS
I have ordered an outpatient echocardiogram to evaluate your heart further.  Stay hydrated and rest.  Follow-up in the clinic this week for recheck.  Return to this emergency department for any new or worsening symptoms or other questions or concerns.

## 2024-12-31 NOTE — TELEPHONE ENCOUNTER
Writer received call from patient asking if she is ok to fly given her recent ER visit and EKG . Writer did not see anything in ER notes or lab results to indicate that this pt should not fly,this was expressed to pt. She had no further questions.  Tamela Griffiths LPN

## 2025-01-01 LAB
ATRIAL RATE - MUSE: 57 BPM
DIASTOLIC BLOOD PRESSURE - MUSE: NORMAL MMHG
INTERPRETATION ECG - MUSE: NORMAL
P AXIS - MUSE: 39 DEGREES
PR INTERVAL - MUSE: 120 MS
QRS DURATION - MUSE: 78 MS
QT - MUSE: 412 MS
QTC - MUSE: 401 MS
R AXIS - MUSE: 84 DEGREES
SYSTOLIC BLOOD PRESSURE - MUSE: NORMAL MMHG
T AXIS - MUSE: 24 DEGREES
VENTRICULAR RATE- MUSE: 57 BPM

## 2025-01-06 NOTE — PROGRESS NOTES
Assessment & Plan     Chronic migraine with aura without status migrainosus, not intractable  ***  - Adult GI  Referral - Consult Only    Chest wall pain  ***  - XR Ribs & Chest Left G/E 3 Views    Acute pain of left knee  ***  - XR Knee Left 3 Views (Clinic Performed)    Dysmenorrhea  ***  - Ob/Gyn  Referral    No follow-ups on file.    May Vences is a 24 year old, presenting for the following health issues:  Headache and ER F/U        1/7/2025     2:01 PM   Additional Questions   Roomed by nicolás badillo   Accompanied by none         1/7/2025     2:01 PM   Patient Reported Additional Medications   Patient reports taking the following new medications none     History of Present Illness       Reason for visit:  Continued care, asthma check, recent ER visit, echocardiogram results   She is taking medications regularly.       Migraine   Since your last clinic visit, how have your headaches changed?  Improved  How often are you getting headaches or migraines? 2 times a week   Are you able to do normal daily activities when you have a migraine? No  Are you taking rescue/relief medications? (Select all that apply) Other: ubrelvy  How helpful is your rescue/relief medication?  The relief is inconsistent- pt states that recently she gets full relief but if she gets a full migraine there is not a lot of relief she gets  Are you taking any medications to prevent migraines? (Select all that apply)  Topamax  In the past 4 weeks, how often have you gone to urgent care or the emergency room because of your headaches?  0      ED/UC Followup:    Facility:  [facility]  Date of visit: 12/30  Reason for visit: dizziness  Current Status: has been drinking a lot of pedilyte, she states that she is still having some dizziness when she walks around and going up stairs otherwise she has been getting better    Concern - fall  Onset: 1/2  Description:   Pt fell while ice skating, she landed on her stomach, is still  "having some pain in her stomach and left arm  Intensity: moderate  Progression of Symptoms:  worsening  Accompanying Signs & Symptoms:  Left arm pain, stomach pain  Previous history of similar problem:   none  Precipitating factors:   Worsened by: movement  Alleviating factors:  Improved by: tylenol    Therapies Tried and outcome: tylenol     {additonal problems for provider to add (Optional):[id number]}      Review of Systems  Constitutional, neuro, ENT, endocrine, pulmonary, cardiac, gastrointestinal, genitourinary, musculoskeletal, integument and psychiatric systems are negative, except as otherwise noted.      Objective    /87 (BP Location: Left arm, Patient Position: Sitting, Cuff Size: Adult Regular)   Pulse 82   Temp 98  F (36.7  C) (Tympanic)   Resp 16   Ht 1.626 m (5' 4.02\")   Wt 72.3 kg (159 lb 4.8 oz)   SpO2 97%   BMI 27.33 kg/m    Body mass index is 27.33 kg/m .  Physical Exam   GENERAL: alert and no distress  EYES: Eyes grossly normal to inspection  NECK: no adenopathy, no asymmetry, masses, or scars  RESP: lungs clear to auscultation - no rales, rhonchi or wheezes  CV: regular rates and rhythm, normal S1 S2, no S3 or S4, no murmur, click or rub, and no peripheral edema  ABDOMEN: soft, nontender, no hepatosplenomegaly, no masses and bowel sounds normal  MS: ***  SKIN: no suspicious lesions or rashes  PSYCH: mentation appears normal, affect normal/bright    Results for orders placed or performed in visit on 01/07/25   XR Knee Left 3 Views (Clinic Performed)     Status: None    Narrative    PROCEDURE:  XR KNEE LEFT 3 VIEWS    HISTORY: Acute pain of left knee    COMPARISON:  None.    TECHNIQUE:  3 views of the left knee were obtained.    FINDINGS:  No fracture or dislocation is identified. The joint spaces  are preserved.        Impression    IMPRESSION: Normal left knee      CONOR MEADE MD         SYSTEM ID:  [id number]   Results for orders placed or performed in visit on 01/07/25   XR Ribs & " Chest Left G/E 3 Views     Status: None    Narrative    PROCEDURE: XR RIBS AND CHEST LEFT 3 VIEWS 2025 2:44 PM    HISTORY: Chest wall pain    COMPARISONS: None.    TECHNIQUE: Chest one view, left RIBS 2 views    FINDINGS: Chest: The lungs are clear. The heart and the pulmonary  vessels are within normal limits. There is no pneumothorax or pleural  effusion.    Left RIBS: There are no left rib fractures or destructive lesions  noted.         Impression    IMPRESSION: Normal chest and left RIBS    CONOR MEADE MD         SYSTEM ID:  [id number]   Results for orders placed or performed during the hospital encounter of 25   Echocardiogram Complete     Status: None   Result Value Ref Range    LVEF  55-60%     Narrative    [id number]  [id number]  [id number]  [id number]^DAY^ROXANE^JAGDEEP     [facility]  Echocardiography Laboratory  [address] [place] [id number]     Name: VLADISLAV RIDLEY  MRN: [id number]  : 2000  Study Date: 2025 09:57 AM  Age: 24 yrs  Gender: Female  Patient Location: [facility]  Reason For Study: Postural dizziness with near syncope, Postural dizziness  with ne  History: Near syncope, Postural dizziness  Ordering Physician: ROXANE BERNSTEIN  Referring Physician: ROXANE BERNSTEIN  Performed By: Madelin Basurto RDCS     BSA: 1.8 m2  Height: 64 in  Weight: 160 lb  ______________________________________________________________________________  Procedure  Echocardiogram with two-dimensional, color and spectral Doppler.  ______________________________________________________________________________  Interpretation Summary  Global and regional left ventricular function is normal with an EF of 55-60%.  Global right ventricular function is normal.  Both atria appear normal.  No significant valvular abnormalities present.  ______________________________________________________________________________  Left Ventricle  Global and regional left ventricular function is normal  with an EF of 55-60%.  Left ventricular diastolic function is normal.     Right Ventricle  Global right ventricular function is normal.     Atria  Both atria appear normal.     Aortic Valve  The mean AoV pressure gradient is 3.9 mmHg. The peak AoV pressure gradient is  7.5 mmHg. No aortic stenosis is present.     Tricuspid Valve  Trace tricuspid insufficiency is present.     Vessels  Ascending aorta 2.72 cm. IVC diameter <2.1 cm collapsing >50% with sniff  suggests a normal RA pressure of 3 mmHg.     Pericardium  No pericardial effusion is present.     Miscellaneous  No significant valvular abnormalities present.  ______________________________________________________________________________  MMode/2D Measurements & Calculations  IVSd: 0.83 cm  LVIDd: 4.4 cm  LVIDs: 3.0 cm  LVPWd: 0.84 cm  FS: 32.5 %  LV mass(C)d: 117.1 grams  LV mass(C)dI: 65.8 grams/m2  Ao root diam: 3.0 cm  LA dimension: 2.5 cm  asc Aorta Diam: 2.7 cm  LA/Ao: 0.84  LVOT diam: 2.1 cm  LVOT area: 3.4 cm2  Ao root diam index Ht(cm/m): 1.8  Ao root diam index BSA (cm/m2): 1.7     Asc Ao diam index BSA (cm/m2): 1.5  Asc Ao diam index Ht(cm/m): 1.7  LA Volume Indexed (AL/bp): 21.1 ml/m2  RWT: 0.38  TAPSE: 2.1 cm     Doppler Measurements & Calculations  MV E max daniel: 81.0 cm/sec  MV A max daniel: 38.0 cm/sec  MV E/A: 2.1  MV dec slope: 343.0 cm/sec2  MV dec time: 0.24 sec  Ao V2 max: 137.0 cm/sec  Ao max P.5 mmHg  Ao V2 mean: 92.0 cm/sec  Ao mean PG: 3.9 mmHg  Ao V2 VTI: 26.0 cm  SHANIA(I,D): 2.4 cm2  SHANIA(V,D): 2.5 cm2     LV V1 max P.0 mmHg  LV V1 max: 100.0 cm/sec  LV V1 VTI: 18.1 cm  SV(LVOT): 62.2 ml  SI(LVOT): 34.9 ml/m2  AV Daniel Ratio (DI): 0.73  SHANIA Index (cm2/m2): 1.3  E/E' av.9  Lateral E/e': 4.8  Medial E/e': 5.0     ______________________________________________________________________________  Report approved by: Ramón Phillip MD on 2025 12:34 PM                 Signed Electronically by: Silva Self MD  {Email feedback  regarding this note to [email or web address]   :[id number]}

## 2025-01-07 ENCOUNTER — OFFICE VISIT (OUTPATIENT)
Dept: FAMILY MEDICINE | Facility: OTHER | Age: 25
End: 2025-01-07
Attending: FAMILY MEDICINE
Payer: COMMERCIAL

## 2025-01-07 ENCOUNTER — HOSPITAL ENCOUNTER (OUTPATIENT)
Dept: CARDIOLOGY | Facility: HOSPITAL | Age: 25
Discharge: HOME | End: 2025-01-07
Attending: PHYSICIAN ASSISTANT
Payer: COMMERCIAL

## 2025-01-07 ENCOUNTER — ANCILLARY PROCEDURE (OUTPATIENT)
Dept: GENERAL RADIOLOGY | Facility: OTHER | Age: 25
End: 2025-01-07
Attending: FAMILY MEDICINE
Payer: COMMERCIAL

## 2025-01-07 VITALS
OXYGEN SATURATION: 97 % | HEIGHT: 64 IN | BODY MASS INDEX: 27.2 KG/M2 | HEART RATE: 82 BPM | DIASTOLIC BLOOD PRESSURE: 87 MMHG | RESPIRATION RATE: 16 BRPM | TEMPERATURE: 98 F | WEIGHT: 159.3 LBS | SYSTOLIC BLOOD PRESSURE: 131 MMHG

## 2025-01-07 DIAGNOSIS — R55 POSTURAL DIZZINESS WITH NEAR SYNCOPE: ICD-10-CM

## 2025-01-07 DIAGNOSIS — R07.89 CHEST WALL PAIN: ICD-10-CM

## 2025-01-07 DIAGNOSIS — R14.0 ABDOMINAL BLOATING WITH CRAMPS: ICD-10-CM

## 2025-01-07 DIAGNOSIS — M25.562 ACUTE PAIN OF LEFT KNEE: ICD-10-CM

## 2025-01-07 DIAGNOSIS — R10.9 ABDOMINAL BLOATING WITH CRAMPS: ICD-10-CM

## 2025-01-07 DIAGNOSIS — N94.6 DYSMENORRHEA: ICD-10-CM

## 2025-01-07 DIAGNOSIS — R42 POSTURAL DIZZINESS WITH NEAR SYNCOPE: ICD-10-CM

## 2025-01-07 DIAGNOSIS — G43.E09 CHRONIC MIGRAINE WITH AURA WITHOUT STATUS MIGRAINOSUS, NOT INTRACTABLE: Primary | ICD-10-CM

## 2025-01-07 LAB — LVEF ECHO: NORMAL

## 2025-01-07 PROCEDURE — G0463 HOSPITAL OUTPT CLINIC VISIT: HCPCS | Mod: 25

## 2025-01-07 PROCEDURE — 99214 OFFICE O/P EST MOD 30 MIN: CPT | Performed by: FAMILY MEDICINE

## 2025-01-07 PROCEDURE — 71101 X-RAY EXAM UNILAT RIBS/CHEST: CPT | Mod: TC,LT

## 2025-01-07 PROCEDURE — G2211 COMPLEX E/M VISIT ADD ON: HCPCS | Performed by: FAMILY MEDICINE

## 2025-01-07 PROCEDURE — 93306 TTE W/DOPPLER COMPLETE: CPT | Mod: 26 | Performed by: INTERNAL MEDICINE

## 2025-01-07 PROCEDURE — 93306 TTE W/DOPPLER COMPLETE: CPT

## 2025-01-07 PROCEDURE — 73562 X-RAY EXAM OF KNEE 3: CPT | Mod: TC,LT

## 2025-01-07 RX ORDER — TOPIRAMATE 25 MG/1
25 TABLET, FILM COATED ORAL 2 TIMES DAILY
Qty: 180 TABLET | Refills: 0 | Status: CANCELLED | OUTPATIENT
Start: 2025-01-07

## 2025-01-07 ASSESSMENT — PATIENT HEALTH QUESTIONNAIRE - PHQ9
10. IF YOU CHECKED OFF ANY PROBLEMS, HOW DIFFICULT HAVE THESE PROBLEMS MADE IT FOR YOU TO DO YOUR WORK, TAKE CARE OF THINGS AT HOME, OR GET ALONG WITH OTHER PEOPLE: EXTREMELY DIFFICULT
SUM OF ALL RESPONSES TO PHQ QUESTIONS 1-9: 18
SUM OF ALL RESPONSES TO PHQ QUESTIONS 1-9: 18

## 2025-01-09 ENCOUNTER — HOSPITAL ENCOUNTER (EMERGENCY)
Facility: HOSPITAL | Age: 25
Discharge: HOME | End: 2025-01-09
Attending: INTERNAL MEDICINE
Payer: COMMERCIAL

## 2025-01-09 VITALS
HEART RATE: 69 BPM | OXYGEN SATURATION: 97 % | SYSTOLIC BLOOD PRESSURE: 118 MMHG | TEMPERATURE: 97.9 F | DIASTOLIC BLOOD PRESSURE: 70 MMHG | RESPIRATION RATE: 16 BRPM

## 2025-01-09 DIAGNOSIS — R10.13 EPIGASTRIC PAIN: ICD-10-CM

## 2025-01-09 LAB
ALBUMIN SERPL BCG-MCNC: 4.8 G/DL (ref 3.5–5.2)
ALBUMIN UR-MCNC: NEGATIVE MG/DL
ALP SERPL-CCNC: 43 U/L (ref 40–150)
ALT SERPL W P-5'-P-CCNC: 14 U/L (ref 0–50)
ANION GAP SERPL CALCULATED.3IONS-SCNC: 15 MMOL/L (ref 7–15)
APPEARANCE UR: CLEAR
AST SERPL W P-5'-P-CCNC: 19 U/L (ref 0–45)
BASOPHILS # BLD AUTO: 0.1 10E3/UL (ref 0–0.2)
BASOPHILS NFR BLD AUTO: 1 %
BILIRUB SERPL-MCNC: 0.5 MG/DL
BILIRUB UR QL STRIP: NEGATIVE
BUN SERPL-MCNC: 8.9 MG/DL (ref 6–20)
CALCIUM SERPL-MCNC: 9.4 MG/DL (ref 8.8–10.4)
CHLORIDE SERPL-SCNC: 102 MMOL/L (ref 98–107)
COLOR UR AUTO: ABNORMAL
CREAT SERPL-MCNC: 0.86 MG/DL (ref 0.51–0.95)
CRP SERPL-MCNC: <3 MG/L
EGFRCR SERPLBLD CKD-EPI 2021: >90 ML/MIN/1.73M2
EOSINOPHIL # BLD AUTO: 0.3 10E3/UL (ref 0–0.7)
EOSINOPHIL NFR BLD AUTO: 6 %
ERYTHROCYTE [DISTWIDTH] IN BLOOD BY AUTOMATED COUNT: 12.6 % (ref 10–15)
GLUCOSE SERPL-MCNC: 81 MG/DL (ref 70–99)
GLUCOSE UR STRIP-MCNC: NEGATIVE MG/DL
HCG UR QL: NEGATIVE
HCO3 SERPL-SCNC: 22 MMOL/L (ref 22–29)
HCT VFR BLD AUTO: 38.7 % (ref 35–47)
HGB BLD-MCNC: 13.4 G/DL (ref 11.7–15.7)
HGB UR QL STRIP: ABNORMAL
HOLD SPECIMEN: NORMAL
HOLD SPECIMEN: NORMAL
IMM GRANULOCYTES # BLD: 0 10E3/UL
IMM GRANULOCYTES NFR BLD: 0 %
KETONES UR STRIP-MCNC: 60 MG/DL
LACTATE SERPL-SCNC: 0.7 MMOL/L (ref 0.7–2)
LEUKOCYTE ESTERASE UR QL STRIP: NEGATIVE
LIPASE SERPL-CCNC: 35 U/L (ref 13–60)
LYMPHOCYTES # BLD AUTO: 2 10E3/UL (ref 0.8–5.3)
LYMPHOCYTES NFR BLD AUTO: 39 %
MCH RBC QN AUTO: 29.6 PG (ref 26.5–33)
MCHC RBC AUTO-ENTMCNC: 34.6 G/DL (ref 31.5–36.5)
MCV RBC AUTO: 86 FL (ref 78–100)
MONOCYTES # BLD AUTO: 0.4 10E3/UL (ref 0–1.3)
MONOCYTES NFR BLD AUTO: 8 %
MUCOUS THREADS #/AREA URNS LPF: PRESENT /LPF
NEUTROPHILS # BLD AUTO: 2.3 10E3/UL (ref 1.6–8.3)
NEUTROPHILS NFR BLD AUTO: 45 %
NITRATE UR QL: NEGATIVE
NRBC # BLD AUTO: 0 10E3/UL
NRBC BLD AUTO-RTO: 0 /100
PH UR STRIP: 6.5 [PH] (ref 4.7–8)
PLATELET # BLD AUTO: 297 10E3/UL (ref 150–450)
POTASSIUM SERPL-SCNC: 3.2 MMOL/L (ref 3.4–5.3)
PROT SERPL-MCNC: 7.2 G/DL (ref 6.4–8.3)
RBC # BLD AUTO: 4.52 10E6/UL (ref 3.8–5.2)
RBC URINE: 6 /HPF
SODIUM SERPL-SCNC: 139 MMOL/L (ref 135–145)
SP GR UR STRIP: 1.01 (ref 1–1.03)
SQUAMOUS EPITHELIAL: 0 /HPF
UROBILINOGEN UR STRIP-MCNC: NORMAL MG/DL
WBC # BLD AUTO: 5 10E3/UL (ref 4–11)
WBC URINE: 3 /HPF

## 2025-01-09 PROCEDURE — 250N000011 HC RX IP 250 OP 636: Performed by: INTERNAL MEDICINE

## 2025-01-09 PROCEDURE — 36415 COLL VENOUS BLD VENIPUNCTURE: CPT | Performed by: INTERNAL MEDICINE

## 2025-01-09 PROCEDURE — 250N000013 HC RX MED GY IP 250 OP 250 PS 637: Performed by: INTERNAL MEDICINE

## 2025-01-09 PROCEDURE — 82040 ASSAY OF SERUM ALBUMIN: CPT | Performed by: INTERNAL MEDICINE

## 2025-01-09 PROCEDURE — 82565 ASSAY OF CREATININE: CPT | Performed by: INTERNAL MEDICINE

## 2025-01-09 PROCEDURE — 83605 ASSAY OF LACTIC ACID: CPT | Performed by: INTERNAL MEDICINE

## 2025-01-09 PROCEDURE — 85025 COMPLETE CBC W/AUTO DIFF WBC: CPT | Performed by: INTERNAL MEDICINE

## 2025-01-09 PROCEDURE — 86140 C-REACTIVE PROTEIN: CPT | Performed by: INTERNAL MEDICINE

## 2025-01-09 PROCEDURE — 83690 ASSAY OF LIPASE: CPT | Performed by: INTERNAL MEDICINE

## 2025-01-09 PROCEDURE — 99284 EMERGENCY DEPT VISIT MOD MDM: CPT | Performed by: INTERNAL MEDICINE

## 2025-01-09 PROCEDURE — 99283 EMERGENCY DEPT VISIT LOW MDM: CPT

## 2025-01-09 PROCEDURE — 81025 URINE PREGNANCY TEST: CPT | Performed by: INTERNAL MEDICINE

## 2025-01-09 PROCEDURE — 81001 URINALYSIS AUTO W/SCOPE: CPT | Performed by: INTERNAL MEDICINE

## 2025-01-09 RX ORDER — MAGNESIUM HYDROXIDE/ALUMINUM HYDROXICE/SIMETHICONE 120; 1200; 1200 MG/30ML; MG/30ML; MG/30ML
30 SUSPENSION ORAL ONCE
Status: COMPLETED | OUTPATIENT
Start: 2025-01-09 | End: 2025-01-09

## 2025-01-09 RX ORDER — OXYCODONE HYDROCHLORIDE 5 MG/1
5 TABLET ORAL EVERY 6 HOURS PRN
Qty: 6 TABLET | Refills: 0 | Status: SHIPPED | OUTPATIENT
Start: 2025-01-09 | End: 2025-01-14

## 2025-01-09 RX ORDER — ONDANSETRON 4 MG/1
8 TABLET, ORALLY DISINTEGRATING ORAL ONCE
Status: COMPLETED | OUTPATIENT
Start: 2025-01-09 | End: 2025-01-09

## 2025-01-09 RX ORDER — FAMOTIDINE 20 MG/1
40 TABLET, FILM COATED ORAL ONCE
Status: COMPLETED | OUTPATIENT
Start: 2025-01-09 | End: 2025-01-09

## 2025-01-09 RX ADMIN — FAMOTIDINE 40 MG: 20 TABLET ORAL at 04:18

## 2025-01-09 RX ADMIN — ALUMINUM HYDROXIDE, MAGNESIUM HYDROXIDE, AND SIMETHICONE 30 ML: 200; 200; 20 SUSPENSION ORAL at 03:14

## 2025-01-09 RX ADMIN — ONDANSETRON 8 MG: 4 TABLET, ORALLY DISINTEGRATING ORAL at 03:14

## 2025-01-09 ASSESSMENT — COLUMBIA-SUICIDE SEVERITY RATING SCALE - C-SSRS
2. HAVE YOU ACTUALLY HAD ANY THOUGHTS OF KILLING YOURSELF IN THE PAST MONTH?: NO
6. HAVE YOU EVER DONE ANYTHING, STARTED TO DO ANYTHING, OR PREPARED TO DO ANYTHING TO END YOUR LIFE?: NO
1. IN THE PAST MONTH, HAVE YOU WISHED YOU WERE DEAD OR WISHED YOU COULD GO TO SLEEP AND NOT WAKE UP?: NO

## 2025-01-09 ASSESSMENT — ACTIVITIES OF DAILY LIVING (ADL)
ADLS_ACUITY_SCORE: 43

## 2025-01-09 NOTE — ED TRIAGE NOTES
Patient presents to ED with c/o 5-6/10 low abdominal pain bilat, shakiness r/t pain, L extremity pain, back pain. Vaginal bleeding. Per patient this pain has been ongoing since a fall on the ice that occurred on Arturo 3, 2025. Per patient pain worsened this evening she did take 1000 mg of tylenol and had left over muscle relaxer medications she states took two tablets of the muscle relaxer unknown exact dose and name of medication. Took around 0055 right before coming in and states they are helping some pain was 10/10 before coming in.

## 2025-01-12 ENCOUNTER — APPOINTMENT (OUTPATIENT)
Dept: CT IMAGING | Facility: HOSPITAL | Age: 25
End: 2025-01-12
Attending: NURSE PRACTITIONER
Payer: COMMERCIAL

## 2025-01-12 ENCOUNTER — HOSPITAL ENCOUNTER (EMERGENCY)
Facility: HOSPITAL | Age: 25
Discharge: HOME | End: 2025-01-12
Attending: NURSE PRACTITIONER
Payer: COMMERCIAL

## 2025-01-12 VITALS
SYSTOLIC BLOOD PRESSURE: 124 MMHG | WEIGHT: 159 LBS | OXYGEN SATURATION: 98 % | BODY MASS INDEX: 27.28 KG/M2 | RESPIRATION RATE: 18 BRPM | HEART RATE: 70 BPM | TEMPERATURE: 97 F | DIASTOLIC BLOOD PRESSURE: 77 MMHG

## 2025-01-12 DIAGNOSIS — N93.9 VAGINAL BLEEDING: ICD-10-CM

## 2025-01-12 DIAGNOSIS — B96.89 BACTERIAL VAGINOSIS: ICD-10-CM

## 2025-01-12 DIAGNOSIS — M54.9 BACK PAIN: ICD-10-CM

## 2025-01-12 DIAGNOSIS — R10.84 GENERALIZED ABDOMINAL PAIN: ICD-10-CM

## 2025-01-12 DIAGNOSIS — N76.0 BACTERIAL VAGINOSIS: ICD-10-CM

## 2025-01-12 LAB
ALBUMIN SERPL BCG-MCNC: 4.6 G/DL (ref 3.5–5.2)
ALBUMIN UR-MCNC: NEGATIVE MG/DL
ALP SERPL-CCNC: 41 U/L (ref 40–150)
ALT SERPL W P-5'-P-CCNC: 14 U/L (ref 0–50)
ANION GAP SERPL CALCULATED.3IONS-SCNC: 16 MMOL/L (ref 7–15)
APPEARANCE UR: CLEAR
AST SERPL W P-5'-P-CCNC: 18 U/L (ref 0–45)
BACTERIAL VAGINOSIS VAG-IMP: POSITIVE
BILIRUB SERPL-MCNC: 0.7 MG/DL
BILIRUB UR QL STRIP: NEGATIVE
BUN SERPL-MCNC: 10.3 MG/DL (ref 6–20)
C TRACH DNA SPEC QL PROBE+SIG AMP: NEGATIVE
CALCIUM SERPL-MCNC: 9.3 MG/DL (ref 8.8–10.4)
CANDIDA DNA VAG QL NAA+PROBE: NOT DETECTED
CANDIDA GLABRATA / CANDIDA KRUSEI DNA: NOT DETECTED
CHLORIDE SERPL-SCNC: 105 MMOL/L (ref 98–107)
COLOR UR AUTO: YELLOW
CREAT SERPL-MCNC: 0.93 MG/DL (ref 0.51–0.95)
EGFRCR SERPLBLD CKD-EPI 2021: 88 ML/MIN/1.73M2
ERYTHROCYTE [DISTWIDTH] IN BLOOD BY AUTOMATED COUNT: 12.6 % (ref 10–15)
GLUCOSE SERPL-MCNC: 81 MG/DL (ref 70–99)
GLUCOSE UR STRIP-MCNC: NEGATIVE MG/DL
HCG UR QL: NEGATIVE
HCO3 SERPL-SCNC: 19 MMOL/L (ref 22–29)
HCT VFR BLD AUTO: 39.5 % (ref 35–47)
HGB BLD-MCNC: 14 G/DL (ref 11.7–15.7)
HGB UR QL STRIP: NEGATIVE
HOLD SPECIMEN: NORMAL
KETONES UR STRIP-MCNC: 80 MG/DL
LEUKOCYTE ESTERASE UR QL STRIP: NEGATIVE
LIPASE SERPL-CCNC: 37 U/L (ref 13–60)
MCH RBC QN AUTO: 30 PG (ref 26.5–33)
MCHC RBC AUTO-ENTMCNC: 35.4 G/DL (ref 31.5–36.5)
MCV RBC AUTO: 85 FL (ref 78–100)
MUCOUS THREADS #/AREA URNS LPF: PRESENT /LPF
N GONORRHOEA DNA SPEC QL NAA+PROBE: NEGATIVE
NITRATE UR QL: NEGATIVE
PH UR STRIP: 6 [PH] (ref 5–7)
PLATELET # BLD AUTO: 291 10E3/UL (ref 150–450)
POTASSIUM SERPL-SCNC: 4.4 MMOL/L (ref 3.4–5.3)
PROT SERPL-MCNC: 7.3 G/DL (ref 6.4–8.3)
RBC # BLD AUTO: 4.66 10E6/UL (ref 3.8–5.2)
RBC URINE: 1 /HPF
SODIUM SERPL-SCNC: 140 MMOL/L (ref 135–145)
SP GR UR STRIP: 1.01 (ref 1–1.03)
SPECIMEN TYPE: NORMAL
SQUAMOUS EPITHELIAL: <1 /HPF
T VAGINALIS DNA VAG QL NAA+PROBE: NOT DETECTED
UROBILINOGEN UR STRIP-MCNC: NORMAL MG/DL
WBC # BLD AUTO: 5.3 10E3/UL (ref 4–11)
WBC URINE: 2 /HPF

## 2025-01-12 PROCEDURE — 83690 ASSAY OF LIPASE: CPT | Performed by: NURSE PRACTITIONER

## 2025-01-12 PROCEDURE — 82310 ASSAY OF CALCIUM: CPT | Performed by: NURSE PRACTITIONER

## 2025-01-12 PROCEDURE — 82040 ASSAY OF SERUM ALBUMIN: CPT | Performed by: NURSE PRACTITIONER

## 2025-01-12 PROCEDURE — 74177 CT ABD & PELVIS W/CONTRAST: CPT

## 2025-01-12 PROCEDURE — 87491 CHLMYD TRACH DNA AMP PROBE: CPT | Performed by: NURSE PRACTITIONER

## 2025-01-12 PROCEDURE — 81025 URINE PREGNANCY TEST: CPT | Performed by: NURSE PRACTITIONER

## 2025-01-12 PROCEDURE — 85027 COMPLETE CBC AUTOMATED: CPT | Performed by: NURSE PRACTITIONER

## 2025-01-12 PROCEDURE — 99285 EMERGENCY DEPT VISIT HI MDM: CPT | Mod: 25

## 2025-01-12 PROCEDURE — 250N000011 HC RX IP 250 OP 636: Performed by: NURSE PRACTITIONER

## 2025-01-12 PROCEDURE — 72128 CT CHEST SPINE W/O DYE: CPT

## 2025-01-12 PROCEDURE — 81003 URINALYSIS AUTO W/O SCOPE: CPT | Performed by: NURSE PRACTITIONER

## 2025-01-12 PROCEDURE — 99284 EMERGENCY DEPT VISIT MOD MDM: CPT | Performed by: NURSE PRACTITIONER

## 2025-01-12 PROCEDURE — 72131 CT LUMBAR SPINE W/O DYE: CPT

## 2025-01-12 PROCEDURE — 36415 COLL VENOUS BLD VENIPUNCTURE: CPT | Performed by: NURSE PRACTITIONER

## 2025-01-12 PROCEDURE — 81515 NFCT DS BV&VAGINITIS DNA ALG: CPT | Performed by: NURSE PRACTITIONER

## 2025-01-12 RX ORDER — CYCLOBENZAPRINE HCL 10 MG
10 TABLET ORAL 3 TIMES DAILY PRN
Qty: 15 TABLET | Refills: 0 | Status: SHIPPED | OUTPATIENT
Start: 2025-01-12

## 2025-01-12 RX ORDER — METRONIDAZOLE 500 MG/1
500 TABLET ORAL 3 TIMES DAILY
Qty: 14 TABLET | Refills: 0 | Status: SHIPPED | OUTPATIENT
Start: 2025-01-12 | End: 2025-01-24

## 2025-01-12 RX ORDER — IOPAMIDOL 755 MG/ML
78 INJECTION, SOLUTION INTRAVASCULAR ONCE
Status: COMPLETED | OUTPATIENT
Start: 2025-01-12 | End: 2025-01-12

## 2025-01-12 RX ADMIN — IOPAMIDOL 78 ML: 755 INJECTION, SOLUTION INTRAVENOUS at 19:17

## 2025-01-12 ASSESSMENT — ENCOUNTER SYMPTOMS
BACK PAIN: 0
ALLERGIC/IMMUNOLOGIC NEGATIVE: 1
CONFUSION: 0
BLOOD IN STOOL: 0
CARDIOVASCULAR NEGATIVE: 1
LIGHT-HEADEDNESS: 0
HEMATOLOGIC/LYMPHATIC NEGATIVE: 1
MYALGIAS: 0
ABDOMINAL PAIN: 0
VOMITING: 0
CONSTIPATION: 0
VOMITING: 0
NECK STIFFNESS: 0
ABDOMINAL DISTENTION: 0
ARTHRALGIAS: 0
NAUSEA: 0
ENDOCRINE NEGATIVE: 1
HEMATURIA: 0
NEUROLOGICAL NEGATIVE: 1
PSYCHIATRIC NEGATIVE: 1
CONSTITUTIONAL NEGATIVE: 1
HEADACHES: 0
DYSURIA: 0
BACK PAIN: 1
PALPITATIONS: 0
DIZZINESS: 0
NUMBNESS: 0
RESPIRATORY NEGATIVE: 1
DYSURIA: 0
VOICE CHANGE: 0
BLOOD IN STOOL: 0
ABDOMINAL PAIN: 1
NAUSEA: 0
ANAL BLEEDING: 0
COLOR CHANGE: 0
DIAPHORESIS: 0
FLANK PAIN: 1
COUGH: 0
WOUND: 0
CHEST TIGHTNESS: 0
EYES NEGATIVE: 1
FLANK PAIN: 0
NECK PAIN: 0
WHEEZING: 0
SHORTNESS OF BREATH: 0
CHILLS: 0
FEVER: 0
DIARRHEA: 0

## 2025-01-12 ASSESSMENT — COLUMBIA-SUICIDE SEVERITY RATING SCALE - C-SSRS
1. IN THE PAST MONTH, HAVE YOU WISHED YOU WERE DEAD OR WISHED YOU COULD GO TO SLEEP AND NOT WAKE UP?: NO
2. HAVE YOU ACTUALLY HAD ANY THOUGHTS OF KILLING YOURSELF IN THE PAST MONTH?: NO
6. HAVE YOU EVER DONE ANYTHING, STARTED TO DO ANYTHING, OR PREPARED TO DO ANYTHING TO END YOUR LIFE?: NO

## 2025-01-12 ASSESSMENT — ACTIVITIES OF DAILY LIVING (ADL)
ADLS_ACUITY_SCORE: 43
ADLS_ACUITY_SCORE: 43
ADLS_ACUITY_SCORE: 41

## 2025-01-12 NOTE — ED NOTES
Pt assessed by ANIA Godwin in triage and deemed not appropriate for UC. Pt will be seen in the Emergency Department.

## 2025-01-12 NOTE — ED TRIAGE NOTES
Pt comes in with mulitple complaints. Today she reports vaginal bleeding for 11 days. Pt was seen in UC on 1/3 for it, has upcoming OB appointment at end of month. Pt has intermittent cramping that worsens with movement. Pt reports back and left sidede rib pain. Pt rates pain 9/10, took  Meloxicam at home for pain today. Pt reports slight SOB with pain. Pt states she has had chest pain in the past with any physical exertion but no chest pain currently. Pt would like a prescriptions for pain medication as she is out of her oxycodone.          Post-Care Instructions: Patient instructed to apply ice to reduce swelling.

## 2025-01-13 ENCOUNTER — TELEPHONE (OUTPATIENT)
Dept: PSYCHIATRY | Facility: OTHER | Age: 25
End: 2025-01-13

## 2025-01-13 NOTE — CONFIDENTIAL NOTE
January 13, 2025    Lori Flores out of office 1/14/25. Left message for patient to call back to reschedule.    -Alem Gutierrez on 1/13/2025 at 8:23 AM

## 2025-01-13 NOTE — ED PROVIDER NOTES
"  History     Chief Complaint   Patient presents with    Vaginal Bleeding    Back Pain     HPI  Vangie Granados is a 24 year old individual with history of bipolar affective disorder, PTSD, hypothyroidism, migraines, comes in for complaints of back pain, abdominal pain, vaginal bleeding.  Patient states that she fell on 1/3/2025.  States that she was having back pain and rib pain with this.  States that she was seen for it and imaging was completed on the ribs and negative for fracture.  States that \"nobody has addressed her back pain since the fall\".  States that also nobody has followed up with patient's vaginal bleeding since the fall on 1/3/2025\".  Patient states that she has had continuous bleeding since the fall.  States that bleeding worsens with activity.  Has been having some blood clots with this.  Denies pregnancy.  States last menstrual period was on 12/23/2024.  No paresthesias or weakness is reported.  No loss of bowel or bladder control.    Allergies:  Allergies   Allergen Reactions    Sertraline Other (See Comments)     Suicidal ideation       Problem List:    Patient Active Problem List    Diagnosis Date Noted    Migraine, unspecified, not intractable, without status migrainosus 01/21/2024     Priority: Medium    Bipolar affective disorder, currently depressed, moderate (H) 10/25/2023     Priority: Medium    PTSD (post-traumatic stress disorder) 10/25/2023     Priority: Medium    Hypothyroidism 12/07/2022     Priority: Medium    Vitamin B12 deficiency (non anemic) 12/07/2022     Priority: Medium    Vitamin D deficiency 11/15/2022     Priority: Medium    Intracranial aneurysm 11/06/2022     Priority: Medium    Family history of aneurysm of blood vessel of brain 10/04/2022     Priority: Medium        Past Medical History:    Past Medical History:   Diagnosis Date    Depressive disorder 2020    Hypothyroidism 12/7/2022       Past Surgical History:    No past surgical history on file.    Family " History:    No family history on file.    Social History:  Marital Status:   [4]  Social History     Tobacco Use    Smoking status: Never    Smokeless tobacco: Never   Vaping Use    Vaping status: Never Used   Substance Use Topics    Alcohol use: Yes     Comment: Two beers or one mixed liquor drink every other week.    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        Medications:    cyclobenzaprine (FLEXERIL) 10 MG tablet  metroNIDAZOLE (FLAGYL) 500 MG tablet  fluticasone (FLONASE) 50 MCG/ACT nasal spray  oxyCODONE (ROXICODONE) 5 MG tablet  topiramate (TOPAMAX) 25 MG tablet  ubrogepant (UBRELVY) 100 MG tablet          Review of Systems   Constitutional: Negative.    HENT: Negative.     Eyes: Negative.    Respiratory: Negative.     Cardiovascular: Negative.    Gastrointestinal:  Positive for abdominal pain. Negative for blood in stool, constipation, diarrhea, nausea and vomiting.   Endocrine: Negative.    Genitourinary:  Positive for menstrual problem and vaginal bleeding. Negative for dysuria and flank pain.   Musculoskeletal:  Positive for back pain.   Skin: Negative.    Allergic/Immunologic: Negative.    Neurological: Negative.    Hematological: Negative.    Psychiatric/Behavioral: Negative.         Physical Exam   BP: 120/86  Pulse: 86  Temp: 97  F (36.1  C)  Resp: 18  Weight: 72.1 kg (159 lb)  SpO2: 96 %      GENERAL APPEARANCE:  The patient is a 24 year old well-developed, well-nourished individual that appears as stated age.  LUNGS:  Breathing is easy.  Breath sounds are equal and clear bilaterally.  No wheezes, rhonchi, or rales.  HEART:  Regular rate and rhythm with normal S1 and S2.  No murmurs, gallops, or rubs.  ABDOMEN:  Soft.  Generalized tenderness to palpation.  No mass, guarding, or rebound.  No organomegaly or hernia.  Bowel sounds are present.  Left CVA tenderness to palpation but no flank mass.  No abdominal bruits or thrills  present upon auscultation/palpation.  GENITOURINARY: Anterior pelvic tenderness noted to palpation.  MUSCULOSKELETAL: Mid thoracic and lumbar spinal tenderness to palpation.  No step-offs or deformities.  No obvious paraspinal tenderness to palpation.  No cervical tenderness, step-offs, deformities noted to palpation.  Cervical range of motion intact.  Strength equal in bilateral legs.  NEUROLOGIC:  No focal sensory or motor deficits are noted.  Gait is normal.    PSYCHIATRIC:  The patient is awake, alert, and oriented x4.  Recent and remote memory is intact.  Appropriate mood and affect.  Calm and cooperative with history and physical exam.  SKIN:  Warm, dry, and well perfused.  Good turgor.  No lesions, nodules, or rashes are noted.  No bruising noted.       ED Course     ED Course as of 01/12/25 2038   Sun Jan 12, 2025 1819 Labs ordered.   1819 In to see patient and history/physical completed.    1838 CT thoracic and lumbar spine ordered.  CT abdomen pelvis of IV contrast ordered.   2023 CT of thoracic spine, lumbar spine, and abdomen pelvis showed no acute abnormality.   2028 Bacterial Vaginosis Organism DNA(!): Positive  Patient positive for bacterial vaginosis.   2033 Patient has no acute findings other than bacterial vaginosis.  Will discharge home on metronidazole 500 mg twice daily x 7 days for treatment.  In regards to her generalized pain acetaminophen and ibuprofen.  Will place on cyclobenzaprine for muscle pain.  Did educate no alcohol use or driving on this.  Continue follow-up as already scheduled.  Return precautions given.                 Results for orders placed or performed during the hospital encounter of 01/12/25 (from the past 24 hours)   CBC with platelets   Result Value Ref Range    WBC Count 5.3 4.0 - 11.0 10e3/uL    RBC Count 4.66 3.80 - 5.20 10e6/uL    Hemoglobin 14.0 11.7 - 15.7 g/dL    Hematocrit 39.5 35.0 - 47.0 %    MCV 85 78 - 100 fL    MCH 30.0 26.5 - 33.0 pg    MCHC 35.4 31.5 -  36.5 g/dL    RDW 12.6 10.0 - 15.0 %    Platelet Count 291 150 - 450 10e3/uL   Comprehensive metabolic panel   Result Value Ref Range    Sodium 140 135 - 145 mmol/L    Potassium 4.4 3.4 - 5.3 mmol/L    Carbon Dioxide (CO2) 19 (L) 22 - 29 mmol/L    Anion Gap 16 (H) 7 - 15 mmol/L    Urea Nitrogen 10.3 6.0 - 20.0 mg/dL    Creatinine 0.93 0.51 - 0.95 mg/dL    GFR Estimate 88 >60 mL/min/1.73m2    Calcium 9.3 8.8 - 10.4 mg/dL    Chloride 105 98 - 107 mmol/L    Glucose 81 70 - 99 mg/dL    Alkaline Phosphatase 41 40 - 150 U/L    AST 18 0 - 45 U/L    ALT 14 0 - 50 U/L    Protein Total 7.3 6.4 - 8.3 g/dL    Albumin 4.6 3.5 - 5.2 g/dL    Bilirubin Total 0.7 <=1.2 mg/dL   Lipase   Result Value Ref Range    Lipase 37 13 - 60 U/L   Extra Tube    Narrative    The following orders were created for panel order Extra Tube.  Procedure                               Abnormality         Status                     ---------                               -----------         ------                     Extra Blue Top Tube[[id number]]                              Final result               Extra Red Top Tube[[id number]]                               Final result               Extra Heparinized Syringe[[id number]]                        Final result                 Please view results for these tests on the individual orders.   Extra Blue Top Tube   Result Value Ref Range    Hold Specimen JIC    Extra Red Top Tube   Result Value Ref Range    Hold Specimen JIC    Extra Heparinized Syringe   Result Value Ref Range    Hold Specimen Ok    UA with Microscopic reflex to Culture    Specimen: Urine, Midstream   Result Value Ref Range    Color Urine Yellow Colorless, Straw, Light Yellow, Yellow    Appearance Urine Clear Clear    Glucose Urine Negative Negative mg/dL    Bilirubin Urine Negative Negative    Ketones Urine 80 (A) Negative mg/dL    Specific Gravity Urine 1.015 1.003 - 1.035    Blood Urine Negative Negative    pH Urine 6.0 5.0 - 7.0    Protein  Albumin Urine Negative Negative mg/dL    Urobilinogen Urine Normal Normal, 2.0 mg/dL    Nitrite Urine Negative Negative    Leukocyte Esterase Urine Negative Negative    Mucus Urine Present (A) None Seen /LPF    RBC Urine 1 <=2 /HPF    WBC Urine 2 <=5 /HPF    Squamous Epithelials Urine <1 <=1 /HPF    Narrative    Urine Culture not indicated   HCG qualitative urine (UPT)   Result Value Ref Range    hCG Urine Qualitative Negative Negative   Multiplex Vaginal Panel by PCR    Specimen: Vagina; Swab   Result Value Ref Range    Bacterial Vaginosis Organism DNA Positive (A) Negative    Candida Group DNA Not Detected Not Detected    Candida glabrata / Paulina krusei DNA Not Detected Not Detected    Trichomonas vaginalis DNA Not Detected Not Detected    Narrative    The Xpert  Xpress MVP test, performed on the [employer], is an automated, qualitative in vitro diagnostic test for the detection of DNA targets from anaerobic bacteria associated with bacterial vaginosis, Candida species associated with vulvovaginal candidiasis, and Trichomonas vaginalis. The assay uses clinician-collected and self-collected vaginal swabs from patients who are symptomatic for vaginitis/ vaginosis. The Xpert  Xpress MVP test utilizes real-time polymerase chain reaction (PCR) for the amplification of specific DNA targets and utilizes fluorogenic target-specific hybridization probes to detect and differentiate DNA. It is intended to aid in the diagnosis of vaginal infections in women with a clinical presentation consistent with bacterial vaginosis, vulvovaginal candidiasis, or trichomoniasis.   The assay targets three anaerobic microorgansims that are associated with bacterial vaginosis (BV). Other organisms that are not detected by the Xpert  Xpress MVP test have also been reported to be associated with BV. The BV organism and Candida species targets of the Xpert  Xpress MVP test can be commensal in women; positive results must be  considered in conjunction with other clinical and patient information to determine the disease status.   Chlamydia trachomatis/Neisseria gonorrhoeae by PCR    Specimen: Vagina; Swab   Result Value Ref Range    Chlamydia Trachomatis Negative Negative    Neisseria gonorrhoeae Negative Negative    CTNG Specimen Source Vagina     Narrative    Assay performed using [employer] real-time, reverse-transcriptase PCR.   CT Thoracic Spine w/o Contrast    Narrative    EXAM: CT THORACIC SPINE W/O CONTRAST  LOCATION: [facility]  DATE: 1/12/2025    INDICATION: Back pain.  COMPARISON: None.  TECHNIQUE: Routine CT Thoracic Spine without IV contrast. Multiplanar reformats. Dose reduction techniques were used.     FINDINGS:  Normal vertebral body heights and alignment. No aggressive bony lesion. Normal disc heights. No herniation. Normal facets. No spinal canal or neural foraminal stenosis.     No extraspinal abnormality.       Impression    IMPRESSION:  1.  Normal CT thoracic spine.     CT Lumbar Spine w/o Contrast    Narrative    EXAM: CT LUMBAR SPINE W/O CONTRAST  LOCATION: [facility]  DATE: 1/12/2025    INDICATION: Pain.  COMPARISON: None.  TECHNIQUE: Routine CT Lumbar Spine without IV contrast. Multiplanar reformats. Dose reduction techniques were used.    FINDINGS:  Nomenclature is based on 5 lumbar type vertebral bodies. Normal vertebral body heights and alignment. No destructive bony lesion. No extraspinal soft tissue abnormality. Unremarkable visualized bony pelvis.    T12-L1: Normal disc height. No herniation. Normal facets. No spinal canal or neural foraminal stenosis.    L1-L2: Normal disc height. No herniation. Normal facets. No spinal canal or neural foraminal stenosis.    L2-L3: Normal disc height. No herniation. Normal facets. No spinal canal or neural foraminal stenosis.    L3-L4: Normal disc height. No herniation. Normal facets. No spinal canal or neural foraminal stenosis.    L4-L5: Normal disc  height. No herniation. Normal facets. No spinal canal or neural foraminal stenosis.    L5-S1: Normal disc height. No herniation. Normal facets. No spinal canal or neural foraminal stenosis.      Impression    IMPRESSION:  1.  Normal CT lumbar spine.   CT Abdomen Pelvis w Contrast    Narrative    EXAM: CT ABDOMEN PELVIS W CONTRAST  LOCATION: [facility]  DATE: 1/12/2025    INDICATION: Generalized pain  COMPARISON: None available  TECHNIQUE: CT scan of the abdomen and pelvis was performed after the injection of Isovue 370 78mL intravenously. Multiplanar reformats were obtained. Dose reduction techniques were used.    FINDINGS:   LOWER CHEST: Lung bases are clear.    ABDOMEN/PELVIS:    HEPATOBILIARY: No suspicious focal hepatic lesion. No radiodense gallstones.    PANCREAS: No main pancreatic ductal dilatation or definite solid pancreatic mass.    SPLEEN: No splenomegaly.    ADRENAL GLANDS: No adrenal nodules.    KIDNEYS/BLADDER: No radiodense kidney/ureteral stones or hydronephrosis in either kidney. Diffuse urinary bladder wall thickening, nonspecific, can be seen with underdistention versus chronic cystitis.    BOWEL: No abnormally dilated bowel loops. The appendix is not definitely visualized. Apparent mild colonic wall thickening predominantly of the ascending colon, likely due to underdistention.    PERITONEUM: No evidence of free fluid in the abdomen and pelvis. No free peritoneal or portal venous gas.    PELVIC ORGANS: Unremarkable.    VASCULATURE: Unremarkable.    LYMPH NODES: No significant abdominopelvic lymphadenopathy.    MUSCULOSKELETAL: No suspicious osseous lesion.      Impression    IMPRESSION:  1.  No evidence of acute pathology in the abdomen and pelvis.  2.  Diffuse urinary bladder wall thickening, nonspecific, can be seen with underdistention versus chronic cystitis, recommend correlation with urinalysis.         Medications   iopamidol (ISOVUE-370) solution 78 mL (78 mLs Intravenous  $Given 1/12/25 1917)   sodium chloride (PF) 0.9% PF flush 50 mL (50 mLs Intravenous $Given 1/12/25 1918)       Assessments & Plan (with Medical Decision Making)     I have reviewed the nursing notes.    I have reviewed the findings, diagnosis, plan and need for follow up with the patient.    Summary:  Patient presents to the ER today for abdominal pain, back pain, and vaginal bleeding.  Potential diagnosis which have been considered and evaluated include UTI, ureteral stone, pregnancy, PID, spinal fracture, intraperitoneal abnormality, as well as others. Many of these have been excluded using the various modalities and assessment as noted on the chart. At the present time, the diagnosis given seems to be the most likely generalized abdominal pain without etiology, back pain without etiology, vaginal bleeding, bacterial vaginosis.  Upon arrival, vitals signs are normal.  The patient is alert and oriented.  Cardiac and respiratory examination normal.  Mid spinal thoracic and lumbar tenderness to palpation but no step-offs or deformities.  No paraspinal abnormalities.  Generalized abdominal tenderness to palpation with left CVA tenderness.  No obvious hernia or mass.  Anterior pelvic tenderness noted to palpation.  Lab work obtained showing WBC of 5.3 with hemoglobin of 14.0.  Electrolytes, renal, hepatic functions normal.  Lipase normal at 37.  Pregnancy negative.  UA shows 80 ketones but no other acute abnormality.  Vaginitis panel positive for bacterial vaginosis.  GC/chlamydia negative.  CT of thoracic spine conducted showing no acute abnormality.  CT lumbar spine showed no acute abnormality.  CT abdomen pelvis of IV contrast showed no acute abnormality other than bladder wall thickening which could be chronic cystitis.  No acute findings on labs or imaging other than bacterial vaginosis.  Will discharge on metronidazole 500 mg twice daily x 7 days.  For pain we will do acetaminophen/ibuprofen.  Will give  cyclobenzaprine 10 mg every 8 hours as needed for muscle pain.  Did educate no alcohol use or driving on this due to sedating effects.  Continue scheduled follow-ups with OB/GYN and PCP.  Return to ER if new or worsening symptoms.  Patient verbalized understand agrees with plan of care.  Patient discharged home.      Critical Care Time: None    Impression and plan discussed with patient. Questions answered, concerns addressed, indications for urgent re-evaluation reviewed, and  given. Patient/Parent/Caregiver agree with treatment plan and have no further questions at this time.  AVS provided at discharge.    This document was prepared using a combination of typing and voice generated software.  While every attempt was made for accuracy, spelling and grammatical errors may exist.            New Prescriptions    CYCLOBENZAPRINE (FLEXERIL) 10 MG TABLET    Take 1 tablet (10 mg) by mouth 3 times daily as needed for muscle spasms.    METRONIDAZOLE (FLAGYL) 500 MG TABLET    Take 1 tablet (500 mg) by mouth 3 times daily.       Final diagnoses:   Back pain   Generalized abdominal pain   Bacterial vaginosis   Vaginal bleeding       1/12/2025   [facility]       Leonid Mcmahon APRN CNP  01/12/25 2039

## 2025-01-13 NOTE — ED PROVIDER NOTES
History     Chief Complaint   Patient presents with    Abdominal Pain    Back Pain    Vaginal Bleeding    Arm Pain     L arm pain    Shaking     The history is provided by the patient.   Chest Pain  Pain location:  L chest  Pain radiates to:  Does not radiate  Pain severity:  Unable to specify  Onset quality:  Sudden  Duration:  1 week  Timing:  Intermittent  Progression:  Waxing and waning  Chronicity:  Recurrent  Associated symptoms: no abdominal pain, no back pain, no cough, no diaphoresis, no dizziness, no fever, no headache, no nausea, no numbness, no palpitations, no shortness of breath and no vomiting        Allergies:  Allergies   Allergen Reactions    Sertraline Other (See Comments)     Suicidal ideation       Problem List:    Patient Active Problem List    Diagnosis Date Noted    Migraine, unspecified, not intractable, without status migrainosus 01/21/2024     Priority: Medium    Bipolar affective disorder, currently depressed, moderate (H) 10/25/2023     Priority: Medium    PTSD (post-traumatic stress disorder) 10/25/2023     Priority: Medium    Hypothyroidism 12/07/2022     Priority: Medium    Vitamin B12 deficiency (non anemic) 12/07/2022     Priority: Medium    Vitamin D deficiency 11/15/2022     Priority: Medium    Intracranial aneurysm 11/06/2022     Priority: Medium    Family history of aneurysm of blood vessel of brain 10/04/2022     Priority: Medium        Past Medical History:    Past Medical History:   Diagnosis Date    Depressive disorder 2020    Hypothyroidism 12/7/2022       Past Surgical History:    No past surgical history on file.    Family History:    No family history on file.    Social History:  Marital Status:   [4]  Social History     Tobacco Use    Smoking status: Never    Smokeless tobacco: Never   Vaping Use    Vaping status: Never Used   Substance Use Topics    Alcohol use: Yes     Comment: Two beers or one mixed liquor drink every other week.    Drug use: Not Currently      Types: Marijuana     Comment: Was addicted due to pain, quit 2 months ago.        Medications:    fluticasone (FLONASE) 50 MCG/ACT nasal spray  oxyCODONE (ROXICODONE) 5 MG tablet  topiramate (TOPAMAX) 25 MG tablet  ubrogepant (UBRELVY) 100 MG tablet  cyclobenzaprine (FLEXERIL) 10 MG tablet  metroNIDAZOLE (FLAGYL) 500 MG tablet          Review of Systems   Constitutional:  Negative for chills, diaphoresis and fever.   HENT:  Negative for voice change.    Eyes:  Negative for visual disturbance.   Respiratory:  Negative for cough, chest tightness, shortness of breath and wheezing.    Cardiovascular:  Positive for chest pain. Negative for palpitations and leg swelling.   Gastrointestinal:  Negative for abdominal distention, abdominal pain, anal bleeding, blood in stool, nausea and vomiting.   Genitourinary:  Positive for flank pain. Negative for decreased urine volume, dysuria and hematuria.   Musculoskeletal:  Negative for arthralgias, back pain, gait problem, myalgias, neck pain and neck stiffness.   Skin:  Negative for color change, pallor, rash and wound.   Neurological:  Negative for dizziness, syncope, light-headedness, numbness and headaches.   Psychiatric/Behavioral:  Negative for confusion and suicidal ideas.        Physical Exam   BP: 131/88  Pulse: 86  Temp: 97.9  F (36.6  C)  Resp: 18  SpO2: 98 %      Physical Exam  Vitals and nursing note reviewed.   Constitutional:       Appearance: She is well-developed.   HENT:      Head: Normocephalic and atraumatic.      Mouth/Throat:      Pharynx: No oropharyngeal exudate.   Eyes:      Conjunctiva/sclera: Conjunctivae normal.      Pupils: Pupils are equal, round, and reactive to light.   Neck:      Thyroid: No thyromegaly.      Vascular: No JVD.      Trachea: No tracheal deviation.   Cardiovascular:      Rate and Rhythm: Normal rate and regular rhythm.      Heart sounds: Normal heart sounds. No murmur heard.     No friction rub. No gallop.   Pulmonary:       Effort: Pulmonary effort is normal. No respiratory distress.      Breath sounds: Normal breath sounds. No stridor. No wheezing or rales.   Chest:      Chest wall: No tenderness.   Abdominal:      General: Bowel sounds are normal. There is no distension.      Palpations: Abdomen is soft. There is no mass.      Tenderness: There is no abdominal tenderness. There is no guarding or rebound.   Musculoskeletal:         General: No tenderness. Normal range of motion.      Cervical back: Normal range of motion and neck supple.   Lymphadenopathy:      Cervical: No cervical adenopathy.   Skin:     General: Skin is warm and dry.      Coloration: Skin is not pale.      Findings: No erythema or rash.   Neurological:      Mental Status: She is alert and oriented to person, place, and time.   Psychiatric:         Behavior: Behavior normal.         ED Course        Procedures                  No results found for this or any previous visit (from the past 24 hours).    Medications   alum & mag hydroxide-simethicone (MAALOX) suspension 30 mL (30 mLs Oral $Given 1/9/25 0314)   ondansetron (ZOFRAN ODT) ODT tab 8 mg (8 mg Oral $Given 1/9/25 0314)   famotidine (PEPCID) tablet 40 mg (40 mg Oral $Given 1/9/25 5703)       Assessments & Plan (with Medical Decision Making)   Left rib pain, left flank pain since her fall more than a week ago  Xray ribs negative was done outpatient    As per patient tried tylenol and NSAIDs but did not worke    6 oxycodone tablet prescribed  D  C home, follow-up with PCP  I have reviewed the nursing notes.    I have reviewed the findings, diagnosis, plan and need for follow up with the patient.          Discharge Medication List as of 1/9/2025  4:29 AM        START taking these medications    Details   oxyCODONE (ROXICODONE) 5 MG tablet Take 1 tablet (5 mg) by mouth every 6 hours as needed for severe pain., Disp-6 tablet, R-0, [facility]             Final diagnoses:   Epigastric pain       1/9/2025   [facility]       Napoleon Zavala MD  01/12/25 7884

## 2025-01-13 NOTE — ED NOTES
Pt walked to room with significant other, with a strong steady gait, pt laying in bed, with call light within reach.

## 2025-01-14 ENCOUNTER — HOSPITAL ENCOUNTER (EMERGENCY)
Facility: HOSPITAL | Age: 25
Discharge: HOME | End: 2025-01-14
Attending: PHYSICIAN ASSISTANT
Payer: COMMERCIAL

## 2025-01-14 ENCOUNTER — TELEPHONE (OUTPATIENT)
Dept: CARE COORDINATION | Facility: OTHER | Age: 25
End: 2025-01-14

## 2025-01-14 ENCOUNTER — TELEPHONE (OUTPATIENT)
Dept: FAMILY MEDICINE | Facility: OTHER | Age: 25
End: 2025-01-14

## 2025-01-14 VITALS
HEART RATE: 72 BPM | DIASTOLIC BLOOD PRESSURE: 83 MMHG | TEMPERATURE: 98.4 F | BODY MASS INDEX: 27.55 KG/M2 | RESPIRATION RATE: 16 BRPM | SYSTOLIC BLOOD PRESSURE: 120 MMHG | OXYGEN SATURATION: 98 % | WEIGHT: 160.6 LBS

## 2025-01-14 DIAGNOSIS — F31.10 BIPOLAR DISEASE, MANIC (H): ICD-10-CM

## 2025-01-14 PROCEDURE — 99283 EMERGENCY DEPT VISIT LOW MDM: CPT

## 2025-01-14 PROCEDURE — 99283 EMERGENCY DEPT VISIT LOW MDM: CPT | Performed by: PHYSICIAN ASSISTANT

## 2025-01-14 RX ORDER — LAMOTRIGINE 25 MG/1
25 TABLET ORAL DAILY
Qty: 30 TABLET | Refills: 0 | Status: SHIPPED | OUTPATIENT
Start: 2025-01-14 | End: 2025-01-17

## 2025-01-14 ASSESSMENT — COLUMBIA-SUICIDE SEVERITY RATING SCALE - C-SSRS
4. HAVE YOU HAD THESE THOUGHTS AND HAD SOME INTENTION OF ACTING ON THEM?: NO
1. IN THE PAST MONTH, HAVE YOU WISHED YOU WERE DEAD OR WISHED YOU COULD GO TO SLEEP AND NOT WAKE UP?: NO
6. HAVE YOU EVER DONE ANYTHING, STARTED TO DO ANYTHING, OR PREPARED TO DO ANYTHING TO END YOUR LIFE?: NO
3. HAVE YOU BEEN THINKING ABOUT HOW YOU MIGHT KILL YOURSELF?: NO
5. HAVE YOU STARTED TO WORK OUT OR WORKED OUT THE DETAILS OF HOW TO KILL YOURSELF? DO YOU INTEND TO CARRY OUT THIS PLAN?: NO
2. HAVE YOU ACTUALLY HAD ANY THOUGHTS OF KILLING YOURSELF IN THE PAST MONTH?: YES

## 2025-01-14 ASSESSMENT — ACTIVITIES OF DAILY LIVING (ADL): ADLS_ACUITY_SCORE: 41

## 2025-01-14 NOTE — ED PROVIDER NOTES
"  History     Chief Complaint   Patient presents with    Psychiatric Evaluation     The history is provided by the patient.     Vangie Granados is a 24 year old female who presented to the emergency department ambulatory along with significant other for evaluation of concerns over recent manic episodes.  She has a history of bipolar 2 disorder and reports that she is felt more manic recently.  She has been able to get into her psychiatry provider.  No suicidal or homicidal ideation.  No drugs or alcohol.  She reports she takes Topamax for migraines.  She reports that she has failed lithium as well as SSRIs and SNRIs in the past.  Her manic episodes are described as \"cycling\" as well as hypervigilance, excessive cleaning, etc.    Allergies:  Allergies   Allergen Reactions    Sertraline Other (See Comments)     Suicidal ideation       Problem List:    Patient Active Problem List    Diagnosis Date Noted    Migraine, unspecified, not intractable, without status migrainosus 01/21/2024     Priority: Medium    Bipolar affective disorder, currently depressed, moderate (H) 10/25/2023     Priority: Medium    PTSD (post-traumatic stress disorder) 10/25/2023     Priority: Medium    Hypothyroidism 12/07/2022     Priority: Medium    Vitamin B12 deficiency (non anemic) 12/07/2022     Priority: Medium    Vitamin D deficiency 11/15/2022     Priority: Medium    Intracranial aneurysm 11/06/2022     Priority: Medium    Family history of aneurysm of blood vessel of brain 10/04/2022     Priority: Medium        Past Medical History:    Past Medical History:   Diagnosis Date    Depressive disorder 2020    Hypothyroidism 12/7/2022       Past Surgical History:    No past surgical history on file.    Family History:    No family history on file.    Social History:  Marital Status:   [4]  Social History     Tobacco Use    Smoking status: Never    Smokeless tobacco: Never   Vaping Use    Vaping status: Never Used   Substance Use " "Topics    Alcohol use: Yes     Comment: Two beers or one mixed liquor drink every other week.    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        Medications:    cyclobenzaprine (FLEXERIL) 10 MG tablet  fluticasone (FLONASE) 50 MCG/ACT nasal spray  lamoTRIgine (LAMICTAL) 25 MG tablet  metroNIDAZOLE (FLAGYL) 500 MG tablet  topiramate (TOPAMAX) 25 MG tablet  ubrogepant (UBRELVY) 100 MG tablet          Review of Systems   Psychiatric/Behavioral:          See HPI       Physical Exam   BP: 120/83  Pulse: 72  Temp: 98.4  F (36.9  C)  Resp: 16  Weight: 72.8 kg (160 lb 9.6 oz)  SpO2: 98 %      Physical Exam  Vitals and nursing note reviewed.   Constitutional:       General: She is not in acute distress.     Appearance: Normal appearance. She is normal weight. She is not ill-appearing, toxic-appearing or diaphoretic.   Neurological:      General: No focal deficit present.      Mental Status: She is alert and oriented to person, place, and time.   Psychiatric:      Comments: Pleasant and talkative 24-year-old female found semireclined in no distress.  She has no evidence of acute delusions, psychosis, or flight of ideas.         ED Course        Procedures              Critical Care time:  none              No results found for this or any previous visit (from the past 24 hours).    Medications - No data to display    Assessments & Plan (with Medical Decision Making)   24-year-old female who is requesting a medication for her worsening markus that she reports is secondary to her diagnosed bipolar disease.  She has no suicidal or homicidal ideation.  She denies any drugs or alcohol.  She reports multiple \"cycles\" of markus recently.  Failed multiple medication in the past.  Unable to get into the psychiatry clinic.  I did call and discuss her case with on-call psychiatrist Dr. Jerome who agreed that starting Lamictal at 25 mg daily would be in her best interest.  " Close clinic follow-up has been scheduled already.  Return precautions provided.  The patient was quite happy and agreeable.  Certainly no reasonable indication for admission at this time.    This document was prepared using a combination of typing and voice generated software.  While every attempt was made for accuracy, spelling and grammatical errors may exist.     I have reviewed the nursing notes.    I have reviewed the findings, diagnosis, plan and need for follow up with the patient.           Medical Decision Making  The patient's presentation was of moderate complexity (a chronic illness mild to moderate exacerbation, progression, or side effect of treatment).    The patient's evaluation involved:  discussion of management or test interpretation with another health professional (Dr. Jerome)    The patient's management necessitated moderate risk (prescription drug management including medications given in the ED).        New Prescriptions    LAMOTRIGINE (LAMICTAL) 25 MG TABLET    Take 1 tablet (25 mg) by mouth daily.       Final diagnoses:   Bipolar disease, manic (H)       1/14/2025   [facility]       Andrés North PA-C  01/14/25 0232

## 2025-01-14 NOTE — TELEPHONE ENCOUNTER
11:50 AM    Reason for Call: OVERBOOK    Patient is having the following symptoms: Bipolar concerns. States she has had 4 manic episodes so far this month and really needs to start on some sort of medication. She is requesting to be seen ASAP    Was an appointment offered for this call? No    Preferred method for responding to this message: Telephone Call  What is your phone number ?[phone]     If we cannot reach you directly, may we leave a detailed response at the number you provided? Yes    Can this message wait until your PCP/provider returns, if unavailable today? Not applicable    Laura An

## 2025-01-14 NOTE — TELEPHONE ENCOUNTER
RN called to check in with patient but she did not answer her phone. VM message left with contact number requesting call back.    Kimberly Boecker, RN

## 2025-01-14 NOTE — DISCHARGE INSTRUCTIONS
Please start Lamictal 25 mg daily and follow-up as scheduled.    Return here for any other questions or concerns.    Stop the medication if you develop a rash or other concerning features.

## 2025-01-14 NOTE — ED TRIAGE NOTES
Pt presents with c/o wanting to talk to a psychiatrist so she can get a prescription for lamictal, denies HI and SI at this time.

## 2025-01-15 ENCOUNTER — OFFICE VISIT (OUTPATIENT)
Dept: CHIROPRACTIC MEDICINE | Facility: OTHER | Age: 25
End: 2025-01-15
Attending: CHIROPRACTOR
Payer: COMMERCIAL

## 2025-01-15 ENCOUNTER — PATIENT OUTREACH (OUTPATIENT)
Dept: CARE COORDINATION | Facility: OTHER | Age: 25
End: 2025-01-15

## 2025-01-15 DIAGNOSIS — M99.02 SEGMENTAL AND SOMATIC DYSFUNCTION OF THORACIC REGION: ICD-10-CM

## 2025-01-15 DIAGNOSIS — M54.2 CERVICALGIA: ICD-10-CM

## 2025-01-15 DIAGNOSIS — M99.03 SEGMENTAL AND SOMATIC DYSFUNCTION OF LUMBAR REGION: ICD-10-CM

## 2025-01-15 DIAGNOSIS — M99.01 SEGMENTAL AND SOMATIC DYSFUNCTION OF CERVICAL REGION: Primary | ICD-10-CM

## 2025-01-15 NOTE — PROGRESS NOTES
Clinic Care Coordination Contact  Care Team Conversations    RN CC received a VM message from patient requesting a new referral for psychiatry as she feels she needs to be followed more closely with her Bipolar. RN advised patient to keep her upcoming appointment with Grace on 01/17/2025 and we can discuss new referral at a later time. Patient okay with this.     Patient was seen in the ED 01/14/2025 due to Bipolar manic episode. Patient was started on Lamictal 25 mg once a day.    Patient states she is scheduled with a Dr Alexander Woods at [facility] in [place]. He specializes in Bipolar per patient.     Patient mentioned wanting to try Olanzapine for the manic episodes. RN advised waiting for a month or so to see how the Lamictal works. Patient agreed.     Patient reports Topamax is working very well for her migraines and she said she is very grateful for the care she is receiving at [facility].    RN encouraged patient to call anytime with any needs. RN to follow up in one week.    Kimberly Boecker, RN  Care Coordination

## 2025-01-15 NOTE — Clinical Note
RN CC received a VM message from patient requesting a new referral for psychiatry as she feels she needs to be followed more closely with her Bipolar. RN advised patient to keep her upcoming appointment with Grace on 01/17/2025 and we can discuss new referral at a later time. Patient okay with this.   Patient was seen in the ED 01/14/2025 due to Bipolar manic episode. Patient was started on Lamictal 25 mg once a day.  Patient states she is scheduled with a Dr Alexander Woods at [facility] in [place]. He specializes in Bipolar per patient.   Patient mentioned wanting to try Olanzapine for the manic episodes. RN advised waiting for a month or so to see how the Lamictal works. Patient agreed.   Patient reports Topamax is working very well for her migraines and she said she is very grateful for the care she is receiving at [facility].  RN to follow up in one week.

## 2025-01-16 ASSESSMENT — ANXIETY QUESTIONNAIRES
7. FEELING AFRAID AS IF SOMETHING AWFUL MIGHT HAPPEN: NEARLY EVERY DAY
5. BEING SO RESTLESS THAT IT IS HARD TO SIT STILL: NEARLY EVERY DAY
GAD7 TOTAL SCORE: 21
6. BECOMING EASILY ANNOYED OR IRRITABLE: NEARLY EVERY DAY
3. WORRYING TOO MUCH ABOUT DIFFERENT THINGS: NEARLY EVERY DAY
7. FEELING AFRAID AS IF SOMETHING AWFUL MIGHT HAPPEN: NEARLY EVERY DAY
8. IF YOU CHECKED OFF ANY PROBLEMS, HOW DIFFICULT HAVE THESE MADE IT FOR YOU TO DO YOUR WORK, TAKE CARE OF THINGS AT HOME, OR GET ALONG WITH OTHER PEOPLE?: EXTREMELY DIFFICULT
IF YOU CHECKED OFF ANY PROBLEMS ON THIS QUESTIONNAIRE, HOW DIFFICULT HAVE THESE PROBLEMS MADE IT FOR YOU TO DO YOUR WORK, TAKE CARE OF THINGS AT HOME, OR GET ALONG WITH OTHER PEOPLE: EXTREMELY DIFFICULT
2. NOT BEING ABLE TO STOP OR CONTROL WORRYING: NEARLY EVERY DAY
GAD7 TOTAL SCORE: 21
1. FEELING NERVOUS, ANXIOUS, OR ON EDGE: NEARLY EVERY DAY
GAD7 TOTAL SCORE: 21
4. TROUBLE RELAXING: NEARLY EVERY DAY

## 2025-01-16 ASSESSMENT — PATIENT HEALTH QUESTIONNAIRE - PHQ9
10. IF YOU CHECKED OFF ANY PROBLEMS, HOW DIFFICULT HAVE THESE PROBLEMS MADE IT FOR YOU TO DO YOUR WORK, TAKE CARE OF THINGS AT HOME, OR GET ALONG WITH OTHER PEOPLE: EXTREMELY DIFFICULT
SUM OF ALL RESPONSES TO PHQ QUESTIONS 1-9: 24
SUM OF ALL RESPONSES TO PHQ QUESTIONS 1-9: 24

## 2025-01-16 NOTE — PROGRESS NOTES
Subjective Finding:    Chief compalint: Patient presents with:  Back Pain: Neck pain from a fall on ice , Pain Scale: 4/10, Intensity: sharp, Duration: 1 weeks, Radiating: no.    Date of injury:     Activities that the pain restricts:   Home/household/hobbies/social activities: Yes.  Work duties: No.  Sleep: No.  Makes symptoms better: rest.  Makes symptoms worse: lumbar flexion and cervical flexion.  Have you seen anyone else for the symptoms? No.  Work related: No.  Automobile related injury: No.    Objective and Assessment:    Posture Analysis:   High shoulder: .  Head tilt: .  High iliac crest: .  Head carriage: neutral.  Thoracic Kyphosis: neutral.  Lumbar Lordosis: forward.    Lumbar Range of Motion: extension decreased.  Cervical Range of Motion: left lateral flexion decreased and right lateral flexion decreased.  Thoracic Range of Motion: .  Extremity Range of Motion: .    Palpation:   Traps: sharp pain, referred pain: no    Segmental dysfunction pre-treatment and treatment area: C2, C6, T7, and L5.    Assessment post-treatment:  Cervical: ROM increased.  Thoracic: ROM increased.  Lumbar: ROM increased.    Comments: .      Complicating Factors: .    Procedure(s):  CMT:  80320 Chiropractic manipulative treatment 3-4 regions performed   Cervical: Diversified, See above for level, Supine, Thoracic: Diversified, See above for level, Prone, and Lumbar: Diversified, See above for level, Anterior dorsal    Modalities:  None performed this visit    Therapeutic procedures:  None    Plan:  Treatment plan: PRN.  Instructed patient: stretch as instructed at visit.  Short term goals: reduce pain.  Long term goals: increase ADL.  Prognosis: excellent.

## 2025-01-17 ENCOUNTER — VIRTUAL VISIT (OUTPATIENT)
Dept: PSYCHIATRY | Facility: OTHER | Age: 25
End: 2025-01-17
Payer: COMMERCIAL

## 2025-01-17 DIAGNOSIS — F42.4 EXCORIATION (SKIN-PICKING) DISORDER: ICD-10-CM

## 2025-01-17 DIAGNOSIS — F42.9 OBSESSIVE-COMPULSIVE DISORDER, UNSPECIFIED TYPE: ICD-10-CM

## 2025-01-17 DIAGNOSIS — F51.01 PRIMARY INSOMNIA: ICD-10-CM

## 2025-01-17 DIAGNOSIS — F31.81 BIPOLAR 2 DISORDER (H): Primary | ICD-10-CM

## 2025-01-17 DIAGNOSIS — F43.10 PTSD (POST-TRAUMATIC STRESS DISORDER): ICD-10-CM

## 2025-01-17 DIAGNOSIS — F41.1 GAD (GENERALIZED ANXIETY DISORDER): ICD-10-CM

## 2025-01-17 RX ORDER — LAMOTRIGINE 25 MG/1
TABLET ORAL
Qty: 30 TABLET | Refills: 0 | Status: SHIPPED | OUTPATIENT
Start: 2025-01-17

## 2025-01-17 RX ORDER — LAMOTRIGINE 100 MG/1
TABLET ORAL
Qty: 30 TABLET | Refills: 2 | Status: SHIPPED | OUTPATIENT
Start: 2025-01-17

## 2025-01-17 ASSESSMENT — PAIN SCALES - GENERAL: PAINLEVEL_OUTOF10: NO PAIN (0)

## 2025-01-17 ASSESSMENT — COLUMBIA-SUICIDE SEVERITY RATING SCALE - C-SSRS
6. HAVE YOU EVER DONE ANYTHING, STARTED TO DO ANYTHING, OR PREPARED TO DO ANYTHING TO END YOUR LIFE?: NO
2. IN THE PAST MONTH, HAVE YOU ACTUALLY HAD ANY THOUGHTS OF KILLING YOURSELF?: NO
1. WITHIN THE PAST MONTH, HAVE YOU WISHED YOU WERE DEAD OR WISHED YOU COULD GO TO SLEEP AND NOT WAKE UP?: YES

## 2025-01-17 NOTE — PROGRESS NOTES
OUTPATIENT PSYCHIATRY: TELEPHONE FOLLOW UP      Identifying Information:  Vangie Granados MRN# [id number]   Age: 24 year old YOB: 2000     Initial Psychiatry Visit Date: 10/9/24          Telephone Visit     This synchronous service was provided via telephone and was determined by the provider and patient to be an appropriate and effective means for service delivery. Patient has given verbal consent for telephone visit.    Date of service:  01/17/2025      Type of service:  Telephone visit for mental health treatment.  Time of service: 9:00 AM  Telephone Start Time: 9:10 AM Telephone End Time: 10:08 AM    Reason for telephone visit: [place]  Patient location: [place]  Provider location:  [facility] location, [place]  Mode of Communication:  Telephone         Assessment & Plan     This patient is a 24 year old female who is seen today for follow up. Vangie outlines in a [email or web address] message how she feels she is experiencing markus. She has an upcoming appointment with [place] psychiatry, which I fully support for a second opinion. She has a very complex medical and psychiatric history. We kept all other medications the same, awaiting [facility] appointment.  Ramp up schedule provided for Lamictal to 100 mg. Vangie will follow up in 6 weeks so we can see how she is doing with the Lamictal.    (F31.81) Bipolar 2 disorder (H)  (primary encounter diagnosis)  Plan: lamoTRIgine (LAMICTAL) 25 MG tablet - ramp up scheduled  ARIPiprazole (ABILIFY) 2 MG tablet Take 1 tablet (2 mg) by mouth daily.   topiramate (TOPAMAX) 25 MG tablet Take 1 tablet (25 mg) by mouth 2 times daily. <Also used for migraines>    (F43.10) PTSD (post-traumatic stress disorder)  Plan: ARIPiprazole (ABILIFY) 2 MG tablet Take 1 tablet (2 mg) by mouth daily.     (F41.1) OSCAR (generalized anxiety disorder)  Plan: ARIPiprazole (ABILIFY) 2 MG tablet  Take 1 tablet (2 mg) by mouth daily.     (F42.4) Excoriation  (skin-picking) disorder  Plan: not currently on medication    (F42.9) Obsessive-compulsive disorder, unspecified type  Plan: not currently on medication    (F51.01) Primary insomnia  Plan: topiramate (TOPAMAX) 25 MG tablet Take 1 tablet (25 mg) by mouth 2 times daily. <Also used for migraines>    Referral letter written to [facility] per Vangie's request. She is hoping to see them in February for a second opinion. She reports there is a specialist in bipolar there that she would like to see.    Laboratory: None    Referrals: None    Follow Up: Return to clinic in 1 month     From Dr. Self note 10/2/24: Bipolar affective disorder, currently depressed, moderate (H) / ADHD  Previous diagnosis. Did fairly well on lithium, but stopped due to side effects she felt it was causing. She is also unclear if she truly has bipolar disorder. Based on previous conversations with her, does sound like there may have been some markus/hypomania, but current mood is depressed. Pt has done some research interested in strattera or qelbree. Unclear if qelbree is covered, but this would be the preference given the serotonin effect and her depression currently. Otherwise consider effexor (migraines as well) or wellbutrin. Does have psychiatry follow up scheduled.          History of Present Illness     Vangie Granados is a 24 year old female with a reported history of Excoriation (skin-picking) disorder, MDD, OSCAR, OCD, and insomnia who is here today for follow up. She was last seen on 11/11/24 when we started Viibryd for excoriation disorder. In the interim (11/19/24), we stopped Viibryd due to side effects and restarted Effexor at 37.5 mg. Then on 11/27/24 Vangie messaged that she stopped all her meds in favor of managing her mental health symptoms with marijuana and requested a medical cannabis referral, and wanted to start Topamax. I wanted to see Vangie during an appt to discuss starting a new medication though. Topamax  "was then started by PCP on 12/6 for migraines. Lamictal was restarted during an ED visit on 1/14/25.  Vangie sent a [employer] message on 1/16/25 - biggest concern is getting refills for Lamictal. Went to ED for hyper focused on getting migraines down, and hasn't been focused on appts. Has had 4 episodes in 1 month, starts out manic for 1 week, then depressive for 1 week. Laurel is managing, but sort of spikes throughout the day, bad and then dips, etc. Like her brain is \"hyper fixating, freaking out and making a huge deal about things\" that normally wouldn't bother her. She described it as \"the ultimate chaos\". Been very hard to do anything because the most minor things can trigger her to be angry or irritable. Very franco during manic phases.   Depressive side has been a lot worse. There were 2 depressive episodes that she did self harm and she hasn't done that in a few years. Passive suicidal thoughts but no plan or intention. Last psychiatrist was also looking into borderline personality disorder. Extreme isolation, feels like a \"moldy sack of potatoes\", very easy triggers, extreme melancholy, very depressed, sometimes no emotion at all. Does endorse some dissociation, at [facility] there are times that she doesn't remember periods of time.   Diagnosed with excoriation disorder by providers in [place].  Had considered going inpatient but it causes her so much anxiety and it can sometimes cause her more extreme anxiety and be more detrimental to her mental health.  Also going to see OB/GYN for hormone testing because she has a lot of family history of hormonal problems which may be contributing to her mood symptoms.  Talks a lot, cuts people off consistently, doesn't mean to and doesn't like to be the center of attention. Trouble with concentration, trouble with school (after high school), cannot keep a steady full conversation, jumping from topic to topic,   Has to have routines, toys on desk have to be a certain way, " "when people are in her space things need to be in a certain way.  Still having same PTSD symptoms - whenever she has a trigger she has a flashback, physical response, and mental response, chest starts to hurt, she starts breathing fast, increased heart rate, crying.  Hasn't been able to hold a job. 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  Continuous obsessive thoughts, constantly during manic or depressive states.  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 Hoping to work from home and work well with her mental health.  Needs a letter of recommendation for referral to [facility]. Needed summary of visits and what she is diagnosed with. Someone mentioned if she had a letter of recommendation that might help          Psychiatric Review of Systems     Mood/Depression: \"frantic and depressed\" she feels mood has been steadily going down. Told her friend that she's living with that she's struggling, and he comes to visit her so they can visit. She does have 2 other close friends. Endorses depressed mood, anhedonia, low energy, poor concentration /memory, overwhelmed, and mood dysregulation, loss of interest in activities.     Laurel: Switched major 8-9 times, gone to 5 different colleges; emotions are 200% if on a scale of 0-100%. Went through something with ex- where he pulled a gun on himself and threatened suicide and she just didn't care, there was no emotion, she wasn't scared or sad or upset about it.  Always felt like there was something wrong with her, like her emotions were just too bid.  Can have days that she stays up all night, she feels like this is hyperfocusing relating to ADHD.  She says it's always up and down within the same day.     Anxiety: Worse recently - the scratching is really bad right now - on face, head, back. Doesn't know what those are from - wasn't having those over the summer. Has Eczema and sensitive skin already - so she is scratching " "that which causes more anxiety. The [place] perpetuates. [place] and this is the first time back in [place] so she needs to be better at using lotion daily; needs to start using lotion  Endorses excessive worry, feeling fearful, social anxiety, and nervous/overwhelmed. Has gotten worse the last few days. Gets worse when she feels like she is under the influence of something (the Effexor making her drowsy)  Social anxiety - \"really bad\"  Social anxiety got worse when ADHD got worse.  Very big phobia of exoskeletons, beetles, leonila spiders.      OCD: Germophobe issues, depends on the issue, obsessive , patterns, 3's are bad for her, she has to say things three times. Nightmare for her, of a string, if the string gets tangled she will wake up in a nightmare, panic attack. If she's in somewhere and focusing on something if the tiles are bad or bubbles are bad, will triggers her her ocd and she is hyper focused on that, very embarrassing, like she can't move. Can take a bubble bath and not hyperfocus on the bubbles.      Panic Attacks: Endorses panic attacks. Symptoms include racing heart and SOB     Sleep: has had insomnia for a long time; marijuana would help, used to take benadryl. Has night terrors once in a while.     Appetite: when lived by herself she only ate what she needed to, it made her sad to cook alone. Now overeats - stress eats, bored eats     Concentration/Distractibility: cannot have silence, needs to have noise, excessive spending or wanting to spend, inability to complete tasks (putting clothes away), tries to write things down or it doesn't get done. She said she was diagnosed with ADHD as a kid.     Activity/Energy: almost always tired except for the outbursts of ADHD     Current psychosocial stressors:  medical issues and trauma family of origin issues, health issues, limited social support, mental health symptoms, and occupational / vocational stress   "   Substance Use: No change. Current use includes: 1-2 puffs of vape marijuana per day      Suicidal Ideation: Denies suicidal ideation or self-harm     Homicidal Ideation: Denies homicidal ideation     Therapy: Referred to [facility]         Past Medical/Surgical History     Medical diagnoses:   Past Medical History:   Diagnosis Date    Depressive disorder 2020    First medical diagnosis, had symptoms from age 12    Hypothyroidism 12/7/2022     Surgical history: No past surgical history on file.         Medical Review of Systems     Allergies: Serotonin reuptake inhibitors (ssris) and Sertraline          Vital Signs: There were no vitals taken for this visit.          Physical Exam: Please refer to physical exam completed by primary care provider.    10 point review of systems is otherwise negative unless noted above.           Mental Status Examination     Appearance:  unable to assess due to telephone appointment  Alertness:  sounds alert  and oriented  Attitude:  cooperative  Behavior/Demeanor:  pleasant and calm  Mood:  good and was congruent to speech content.  Affect:  appropriate and in normal range, mood congruent, intensity is normal, and full range  Speech:  regular rate and rhythm  Psychomotor Behavior:  unable to assess due to telephone appointment  Thought Process:  logical, linear, and goal oriented without any evidence of loose associations, flight of ideas, tangentiality, or circumstantiality.  Thought Content:  no evidence of suicidal ideation or homicidal ideation, no evidence of psychotic thought, no auditory hallucinations present, and no visual hallucinations present  Insight:  good  Judgment: good  Cognition:  time, person, and place  Attention Span and Concentration:  intact  Recent and Remote Memory:  intact  Language:  intact  Fund of Knowledge: appropriate  Muscle Strength and Tone: unable to assess due to telephone appointment  Gait and Station: unable to assess due to telephone  appointment     These cognitive functions grossly appear as described, but were not formally tested.         Labs     24 hours: No results found for this or any previous visit (from the past 24 hours).    Labs were reviewed, no concerns.         Medications                                                                  BOLD psych meds     I have reviewed this patient's current medications.    Current Outpatient Medications   Medication Sig Dispense Refill    cyclobenzaprine (FLEXERIL) 10 MG tablet Take 1 tablet (10 mg) by mouth 3 times daily as needed for muscle spasms. 15 tablet 0    fluticasone (FLONASE) 50 MCG/ACT nasal spray Spray 2 sprays in nostril daily.      lamoTRIgine (LAMICTAL) 25 MG tablet Take 1 tablet (25 mg) by mouth daily. 30 tablet 0    topiramate (TOPAMAX) 25 MG tablet Take 1 tablet (25 mg) by mouth 2 times daily. 180 tablet 0    ubrogepant (UBRELVY) 100 MG tablet Take 1 tablet (100 mg) by mouth at onset of headache (Take one tablet by mouth at onset of headache). 16 tablet 1    metroNIDAZOLE (FLAGYL) 500 MG tablet Take 1 tablet (500 mg) by mouth 3 times daily. 14 tablet 0     No current facility-administered medications for this visit.     Reviewed PDMP: N/A     Previous use of Psychotropics/Trials:  Lithium (helped, but toxic)  Zoloft, Lexapro, Viibryd (activated markus)  Amina         PHQ-9 / OSCAR-7                         Reviewed PHQ-9 and OSCAR-7 screenings.         11/8/2024     1:01 PM 1/7/2025    11:08 AM 1/16/2025     1:36 PM   PHQ-9 SCORE   PHQ-9 Total Score MyChart 16 (Moderately severe depression) 18 (Moderately severe depression) 24 (Severe depression)   PHQ-9 Total Score 16  18  24        Patient-reported         10/2/2024     8:54 AM 11/8/2024     1:02 PM 1/16/2025     1:37 PM   OSCAR-7 SCORE   Total Score 18 (severe anxiety) 17 (severe anxiety) 21 (severe anxiety)   Total Score 18 17  21        Patient-reported     Depression Screening Follow-up        1/16/2025     1:36 PM   PHQ    PHQ-9 Total Score 24    Q9: Thoughts of better off dead/self-harm past 2 weeks More than half the days   F/U: Thoughts of suicide or self-harm Yes   F/U: Self harm-plan No   F/U: Self-harm action No   F/U: Safety concerns No       Patient-reported             1/17/2025     9:22 AM   C-SSRS (Brief [place])   Within the last month, have you wished you were dead or wished you could go to sleep and not wake up? Yes   Within the last month, have you had any actual thoughts of killing yourself? No   Within the last month, have you ever done anything, started to do anything, or prepared to do anything to end your life? No     Follow Up  I have reviewed the results of the [place] Screening and proposed plan of care.     DEANDRE Salvador CNP             58 minutes spent by me on the date of the encounter doing chart review, patient visit, and documentation     DEANDRE Caruso, [facility]-BC    Patient was instructed to monitor for worsening symptoms and side effects from medications. If there is a serious deterioration of mood or any dangerous-type behaviors (suicidal/homicidal ideations) patient is advised to call 911 or report directly to the nearest Emergency Department.     Treatment Risk Statement: The risks, benefits, alternatives and potential adverse effects have been explained and are understood by the patient. The patient agrees to the treatment plan with the ability to do so. The patient knows to call the clinic for any problems or access emergency care if needed.

## 2025-01-17 NOTE — LETTER
1/17/25    [facility] - Department of Psychiatry    RE: Referral for 2nd Opinion    Patient: Vangie Granados  YOB: 2000    To Whom it May Concern:    My name is Lori Flores, and I am a Psychiatric Mental Health Nurse Practitioner working with Vangie Granados. Vangie is a 24-year-old  female. I began seeing Vangie on 10/9/24. She would like to be seen at [facility] due to specialization in bipolar disorders.  Vangie has a complex mental health history with diagnoses including bipolar II, OCD, skin excoriation disorder, primary insomnia, anxiety, PTSD. Most recently she believes she is in a rapid cycling bipolar. She was recently started on Lamictal while in the emergency department for this on 1/14/25. There has been a differential of borderline personality disorder that her previous psychiatrist was monitoring for as well.    Vangie remembers having symptoms of depression since 8 years old. The fear is new - OCD and anxiety has been within the past 2 years. Endorses depressed mood, anhedonia, low energy, poor concentration /memory, overwhelmed, and mood dysregulation, loss of interest in activities. Endorses excessive worry, feeling fearful, social anxiety, and nervous/overwhelmed. Endorses panic attacks. Symptoms include racing heart and SOB. Has had insomnia for a long time; marijuana would help, used to take Benadryl. Has night terrors occasionally. Vangie does endorse passive suicidal ideations, throughout her life, but no intent. She has engaged in self-harm in the last few weeks, otherwise it has been years since she has done that. [place]: Switched major 8-9 times, gone to 5 different colleges; emotions are 200% if on a scale of 0-100%. Went through something with ex- where he pulled a gun on himself and threatened suicide and she just didn't care, there was no emotion, she wasn't scared or sad or upset about it. Always felt like there was something wrong with her, like  her emotions were just too big. Can have days that she stays up all night, she feels like this is hyper focusing relating to ADHD. She says it's always up and down within the same day.    It is my hope this letter helps to outline Vangie's mental health symptoms she is currently experiencing and has been for quite some time. We will continue to treat Vangie on a regular basis.     Please contact me if you have any additional questions. I can be reached through my nurse at [phone]. Our fax number is [phone].      Sincerely,      Lori Flores, FATMATA, APRN, PMHNP-BC        Electronically signed

## 2025-01-17 NOTE — PATIENT INSTRUCTIONS
- Continue Lamictal 25 mg daily through 1/28/25. Starting 1/29/25 increase Lamictal to 50 mg daily through 2/12/25. Then on 2/13/25 increase to 100 mg.  Thank you for allowing DEANDRE Caruso, [facility] to participate in your care.  If you have a scheduling or an appointment question please contact our scheduling department at their direct line [phone].   ALL nursing questions or concerns can be directed to the psychiatry nurses at [phone] or [phone]    Community Resources  Crisis Text Line: text or call 128  Suicide LifeLine Chat: suicidepreventionlifeline.org/chat  National [place] on Mental Illness (www.amanda.org): 684.315.8947 or 074-105-2874.   Mental Health Association (www.mentalhealth.org): 742.615.3826 or 887-879-6433

## 2025-01-18 ENCOUNTER — MYC MEDICAL ADVICE (OUTPATIENT)
Dept: FAMILY MEDICINE | Facility: OTHER | Age: 25
End: 2025-01-18

## 2025-01-21 ENCOUNTER — OFFICE VISIT (OUTPATIENT)
Dept: CHIROPRACTIC MEDICINE | Facility: OTHER | Age: 25
End: 2025-01-21
Attending: CHIROPRACTOR
Payer: COMMERCIAL

## 2025-01-21 DIAGNOSIS — M99.01 SEGMENTAL AND SOMATIC DYSFUNCTION OF CERVICAL REGION: ICD-10-CM

## 2025-01-21 DIAGNOSIS — M99.02 SEGMENTAL AND SOMATIC DYSFUNCTION OF THORACIC REGION: ICD-10-CM

## 2025-01-21 DIAGNOSIS — M54.50 ACUTE BILATERAL LOW BACK PAIN WITHOUT SCIATICA: ICD-10-CM

## 2025-01-21 DIAGNOSIS — M99.03 SEGMENTAL AND SOMATIC DYSFUNCTION OF LUMBAR REGION: Primary | ICD-10-CM

## 2025-01-21 PROCEDURE — 98941 CHIROPRACT MANJ 3-4 REGIONS: CPT | Mod: AT | Performed by: CHIROPRACTOR

## 2025-01-21 NOTE — PROGRESS NOTES
Subjective Finding:    Chief compalint: Patient presents with:  Neck Pain: With low back pain and headaches , Pain Scale: 3/10, Intensity: dull, Duration: 1 weeks, Radiating: no.    Date of injury:     Activities that the pain restricts:   Home/household/hobbies/social activities: Yes.  Work duties: No.  Sleep: No.  Makes symptoms better: rest.  Makes symptoms worse: cervical extension.  Have you seen anyone else for the symptoms? No.  Work related: No.  Automobile related injury: No.    Objective and Assessment:    Posture Analysis:   High shoulder: .  Head tilt: .  High iliac crest: .  Head carriage: neutral.  Thoracic Kyphosis: neutral.  Lumbar Lordosis: neutral.    Lumbar Range of Motion: extension decreased.  Cervical Range of Motion: left rotation decreased and right rotation decreased.  Thoracic Range of Motion: .  Extremity Range of Motion: .    Palpation:   Quad lumb: bilateral, referred pain: no  Sub-occiput: sharp pain, referred pain: no    Segmental dysfunction pre-treatment and treatment area: C2, T6, T8, and L5.    Assessment post-treatment:  Cervical: ROM increased.  Thoracic: ROM increased.  Lumbar: ROM increased.    Comments: .      Complicating Factors: .    Procedure(s):  CMT:  71456 Chiropractic manipulative treatment 3-4 regions performed   Cervical: Diversified, See above for level, Supine, Thoracic: Diversified, See above for level, Prone, and Lumbar: Diversified, See above for level, Side posture    Modalities:  None performed this visit    Therapeutic procedures:  None    Plan:  Treatment plan: PRN.  Instructed patient: stretch as instructed at visit.  Short term goals: increase ROM.  Long term goals: increase ADL.  Prognosis: very good.

## 2025-01-22 ENCOUNTER — PATIENT OUTREACH (OUTPATIENT)
Dept: CARE COORDINATION | Facility: OTHER | Age: 25
End: 2025-01-22

## 2025-01-22 PROBLEM — F41.1 GAD (GENERALIZED ANXIETY DISORDER): Status: ACTIVE | Noted: 2025-01-22

## 2025-01-22 PROBLEM — F42.9 OBSESSIVE-COMPULSIVE DISORDER, UNSPECIFIED TYPE: Status: ACTIVE | Noted: 2025-01-22

## 2025-01-22 PROBLEM — F43.10 PTSD (POST-TRAUMATIC STRESS DISORDER): Status: ACTIVE | Noted: 2023-10-25

## 2025-01-22 PROBLEM — F51.01 PRIMARY INSOMNIA: Status: ACTIVE | Noted: 2025-01-22

## 2025-01-22 PROBLEM — F42.4 EXCORIATION (SKIN-PICKING) DISORDER: Status: ACTIVE | Noted: 2025-01-22

## 2025-01-22 PROBLEM — F31.81 BIPOLAR 2 DISORDER (H): Status: ACTIVE | Noted: 2025-01-22

## 2025-01-22 NOTE — PROGRESS NOTES
Clinic Care Coordination Contact  Care Team Conversations    RN CC called to check in with patient. She said she is starting to feel better. She has gotten 2 nights of sleep. Before that she stated she was delusional and hallucination.  Patient was started on Abilify 2 mg daily and Lamictal  to be increased by 25 mg per week until she reaches 100 mg.     Patient has been keeping herself busy with reading. She has a rachel with unlimited books. She said this helps her.     RN encourage patient to call with any needs.     Kimberly Boecker, RN   Care Coordination

## 2025-01-22 NOTE — Clinical Note
RN CC called to check in with patient. She said she is starting to feel better. She has gotten 2 nights of sleep. Before that she stated she was delusional and hallucination. Patient was started on Abilify 2 mg daily and Lamictal  to be increased by 25 mg per week until she reaches 100 mg.   Patient has been keeping herself busy with reading. She has a rachel with unlimited books. She said this helps her.   RN encourage patient to call with any needs.

## 2025-01-24 ENCOUNTER — OFFICE VISIT (OUTPATIENT)
Dept: OBGYN | Facility: OTHER | Age: 25
End: 2025-01-24
Attending: NURSE PRACTITIONER
Payer: COMMERCIAL

## 2025-01-24 VITALS
HEIGHT: 64 IN | DIASTOLIC BLOOD PRESSURE: 68 MMHG | SYSTOLIC BLOOD PRESSURE: 100 MMHG | BODY MASS INDEX: 27.14 KG/M2 | HEART RATE: 64 BPM | WEIGHT: 159 LBS

## 2025-01-24 DIAGNOSIS — N94.6 DYSMENORRHEA: Primary | ICD-10-CM

## 2025-01-24 DIAGNOSIS — Z30.013 ENCOUNTER FOR INITIAL PRESCRIPTION OF INJECTABLE CONTRACEPTIVE: ICD-10-CM

## 2025-01-24 DIAGNOSIS — Z30.46 ENCOUNTER FOR NEXPLANON REMOVAL: ICD-10-CM

## 2025-01-24 PROCEDURE — 11982 REMOVE DRUG IMPLANT DEVICE: CPT | Performed by: NURSE PRACTITIONER

## 2025-01-24 PROCEDURE — G0463 HOSPITAL OUTPT CLINIC VISIT: HCPCS

## 2025-01-24 PROCEDURE — 99212 OFFICE O/P EST SF 10 MIN: CPT | Mod: 25 | Performed by: NURSE PRACTITIONER

## 2025-01-24 PROCEDURE — G0463 HOSPITAL OUTPT CLINIC VISIT: HCPCS | Mod: 25

## 2025-01-24 PROCEDURE — 96372 THER/PROPH/DIAG INJ SC/IM: CPT | Performed by: NURSE PRACTITIONER

## 2025-01-24 PROCEDURE — 250N000011 HC RX IP 250 OP 636: Mod: JZ | Performed by: NURSE PRACTITIONER

## 2025-01-24 RX ORDER — CHOLECALCIFEROL (VITAMIN D3) 50 MCG
1 TABLET ORAL DAILY
COMMUNITY

## 2025-01-24 RX ORDER — DIPHENHYDRAMINE HCL 25 MG
25 TABLET ORAL
COMMUNITY

## 2025-01-24 RX ORDER — MULTIVITAMIN WITH IRON
1 TABLET ORAL DAILY
COMMUNITY

## 2025-01-24 RX ORDER — CYANOCOBALAMIN (VITAMIN B-12) 500 MCG
400 LOZENGE ORAL DAILY
COMMUNITY

## 2025-01-24 RX ORDER — LANOLIN ALCOHOL/MO/W.PET/CERES
1 CREAM (GRAM) TOPICAL
COMMUNITY
Start: 2024-08-19

## 2025-01-24 RX ORDER — MEDROXYPROGESTERONE ACETATE 150 MG/ML
150 INJECTION, SUSPENSION INTRAMUSCULAR
Status: ACTIVE | OUTPATIENT
Start: 2025-01-24 | End: 2026-01-19

## 2025-01-24 RX ADMIN — MEDROXYPROGESTERONE ACETATE 150 MG: 150 INJECTION, SUSPENSION INTRAMUSCULAR at 11:39

## 2025-01-24 ASSESSMENT — PAIN SCALES - GENERAL: PAINLEVEL_OUTOF10: NO PAIN (0)

## 2025-01-24 NOTE — PROGRESS NOTES

## 2025-01-29 NOTE — PROGRESS NOTES
[facility]                HPI     Here at the request of her PCP to discuss dysmenorrhea.  Previously seen 9/20/24 and we placed a Nexplanon.  Since that time her periods have become lighter but cramping and pain remains.  She also notes that her bipolar is much worse prior to and after her menses.  She also has migraine with aura beginning at the age of 8 and increasing in frequency at the age of 18..  we have discussed the various forms of acceptable contraception that are progesterone only or hormone free, as well as the risks and benefits of each.  She would like to remove her Nexplanon and begin using Depo Provera.               Medications:     Current Outpatient Medications   Medication Sig Dispense Refill    ARIPiprazole (ABILIFY) 2 MG tablet Take 1 tablet (2 mg) by mouth daily. 30 tablet 1    cyanocobalamin (VITAMIN B-12) 1000 MCG tablet Take 1 tablet by mouth daily at 2 pm.      cyclobenzaprine (FLEXERIL) 10 MG tablet Take 1 tablet (10 mg) by mouth 3 times daily as needed for muscle spasms. 15 tablet 0    diphenhydrAMINE (BENADRYL) 25 MG tablet Take 25 mg by mouth nightly as needed for itching or allergies.      fluticasone (FLONASE) 50 MCG/ACT nasal spray Spray 2 sprays in nostril daily.      lamoTRIgine (LAMICTAL) 100 MG tablet Starting 2/13/25, Take 1 tablet (100 mg) by mouth daily 30 tablet 2    lamoTRIgine (LAMICTAL) 25 MG tablet Starting 1/29/25, Take 2 tablets (50 mg) by mouth daily for 2 weeks, through 2/12/25. 30 tablet 0    magnesium 250 MG tablet Take 1 tablet by mouth daily.      topiramate (TOPAMAX) 25 MG tablet Take 1 tablet (25 mg) by mouth 2 times daily. 180 tablet 0    ubrogepant (UBRELVY) 100 MG tablet Take 1 tablet (100 mg) by mouth at onset of headache (Take one tablet by mouth at onset of headache). 16 tablet 1    vitamin D3 (CHOLECALCIFEROL) 50 mcg (2000 units) tablet Take 1 tablet by mouth daily.      vitamin E 400 units TABS Take 400 Units by mouth daily.    "    Current Facility-Administered Medications   Medication Dose Route Frequency Provider Last Rate Last Admin    lidocaine 1 % 3 mL  3 mL Subcutaneous Once Elyssa Townsend NP        medroxyPROGESTERone (DEPO-PROVERA) injection 150 mg  150 mg Intramuscular Q90 Days Elyssa Townsend NP   150 mg at 01/24/25 1139                Allergies:   Serotonin reuptake inhibitors (ssris) and Sertraline         Review of Systems:     The 5 point Review of Systems is negative other than noted in the HPI                     Physical Exam:   Blood pressure 100/68, pulse 64, height 1.626 m (5' 4\"), weight 72.1 kg (159 lb).  Constitutional:   awake, alert, cooperative, no apparent distress, and appears stated age      Nexplanon Removal:     Is a pregnancy test required: No.  Was a consent obtained?  Yes    Vangie Granados is here for removal of etonogestrel implant Nexplanon/Implanon    Indication: dysmenorrhea and increased bipolar cycling      Preoperative Diagnosis: etonogestrel implant  Postoperative Diagnosis: etonogestrel implant removed    Technique: On the right arm  Skin prep Betadine  Anesthesia 1% lidocaine  Procedure: Small incision (<5mm) was made at distal end of palpable implant, curved hemostat or mosquito forceps was used to isolate the implant and bring it to the incision, the fibrous capsule containing the implant  was incised and the Implant was removed intact.      EBL: minimal  Complications:  No  Tolerance:  Pt tolerated procedure well and was in stable condition.   Dressing:    A pressure bandage was placed for the next 12-24 hours.    Contraception was discussed and patient chose the following method depoprovera      Follow up: Pt was instructed to call if bleeding, severe pain or foul smell.     Elyssa Townsend NP  "

## 2025-02-08 ENCOUNTER — HOSPITAL ENCOUNTER (EMERGENCY)
Facility: HOSPITAL | Age: 25
Discharge: HOME | End: 2025-02-08
Attending: INTERNAL MEDICINE
Payer: COMMERCIAL

## 2025-02-08 ENCOUNTER — APPOINTMENT (OUTPATIENT)
Dept: GENERAL RADIOLOGY | Facility: HOSPITAL | Age: 25
End: 2025-02-08
Attending: INTERNAL MEDICINE
Payer: COMMERCIAL

## 2025-02-08 VITALS
DIASTOLIC BLOOD PRESSURE: 67 MMHG | RESPIRATION RATE: 17 BRPM | OXYGEN SATURATION: 96 % | SYSTOLIC BLOOD PRESSURE: 119 MMHG | TEMPERATURE: 98 F | HEART RATE: 82 BPM

## 2025-02-08 DIAGNOSIS — S80.01XA CONTUSION OF RIGHT KNEE, INITIAL ENCOUNTER: ICD-10-CM

## 2025-02-08 PROCEDURE — 99283 EMERGENCY DEPT VISIT LOW MDM: CPT

## 2025-02-08 PROCEDURE — 73562 X-RAY EXAM OF KNEE 3: CPT | Mod: RT

## 2025-02-08 PROCEDURE — 99282 EMERGENCY DEPT VISIT SF MDM: CPT | Performed by: INTERNAL MEDICINE

## 2025-02-08 RX ORDER — DICYCLOMINE HYDROCHLORIDE 10 MG/1
10 CAPSULE ORAL
COMMUNITY
Start: 2025-02-06

## 2025-02-08 ASSESSMENT — ACTIVITIES OF DAILY LIVING (ADL): ADLS_ACUITY_SCORE: 41

## 2025-02-09 ASSESSMENT — ENCOUNTER SYMPTOMS
FEVER: 0
ABDOMINAL PAIN: 0
COUGH: 0
SHORTNESS OF BREATH: 0

## 2025-02-09 NOTE — ED TRIAGE NOTES
Patient presents with complaints of knee pain. States she fell last week and didn't think much about it as it was bruised but more severe pain today. States she had knee problems as a kid and that's why the knee brace she has. Knee felt numb at this time but states numbness is getting better as she sits here.      Triage Assessment (Adult)       Row Name 02/08/25 1585          Triage Assessment    Airway WDL WDL        Respiratory WDL    Respiratory WDL WDL        Cognitive/Neuro/Behavioral WDL    Cognitive/Neuro/Behavioral WDL WDL

## 2025-02-09 NOTE — ED NOTES
"Patient independently ambulated to ED Rm 11.     She reports she fell about a week ago, \"all my body weight went on my knee\".   Pain laterally and medially.   She has been wearing a knee brace that she used in the past, \"it's helped a little\".  Slight swelling, bruising.  Tylenol and medical marijuana for pain management.   "

## 2025-02-09 NOTE — ED PROVIDER NOTES
History     Chief Complaint   Patient presents with    Knee Pain     The history is provided by the patient.   Knee Pain  Location:  Knee  Injury: yes    Mechanism of injury: fall    Knee location:  R knee  Pain details:     Quality:  Aching    Severity:  Mild    Onset quality:  Gradual    Duration:  1 week    Timing:  Constant  Chronicity:  New  Associated symptoms: no fever          Allergies:  Allergies   Allergen Reactions    Serotonin Reuptake Inhibitors (Ssris) Other (See Comments)     Increased suicidal ideation with all selective serotonin reuptake inhibitor's per patient    Sertraline Other (See Comments)     Suicidal ideation       Problem List:    Patient Active Problem List    Diagnosis Date Noted    Bipolar 2 disorder (H) 01/22/2025     Priority: Medium    OSCAR (generalized anxiety disorder) 01/22/2025     Priority: Medium    Excoriation (skin-picking) disorder 01/22/2025     Priority: Medium    Obsessive-compulsive disorder, unspecified type 01/22/2025     Priority: Medium    Primary insomnia 01/22/2025     Priority: Medium    Migraine, unspecified, not intractable, without status migrainosus 01/21/2024     Priority: Medium    Bipolar affective disorder, currently depressed, moderate (H) 10/25/2023     Priority: Medium    PTSD (post-traumatic stress disorder) 10/25/2023     Priority: Medium    Hypothyroidism 12/07/2022     Priority: Medium    Vitamin B12 deficiency (non anemic) 12/07/2022     Priority: Medium    Vitamin D deficiency 11/15/2022     Priority: Medium    Intracranial aneurysm 11/06/2022     Priority: Medium    Family history of aneurysm of blood vessel of brain 10/04/2022     Priority: Medium        Past Medical History:    Past Medical History:   Diagnosis Date    Depressive disorder 2020    Hypothyroidism 12/7/2022       Past Surgical History:    History reviewed. No pertinent surgical history.    Family History:    History reviewed. No pertinent family history.    Social  History:  Marital Status:   [4]  Social History     Tobacco Use    Smoking status: Never    Smokeless tobacco: Never   Vaping Use    Vaping status: Never Used   Substance Use Topics    Alcohol use: Yes     Comment: monthly    Drug use: Yes     Types: Marijuana     Comment: daily        Medications:    acetaminophen 500 MG CAPS  ARIPiprazole (ABILIFY) 2 MG tablet  cyanocobalamin (VITAMIN B-12) 1000 MCG tablet  dicyclomine (BENTYL) 10 MG capsule  diphenhydrAMINE (BENADRYL) 25 MG tablet  fluticasone (FLONASE) 50 MCG/ACT nasal spray  lamoTRIgine (LAMICTAL) 100 MG tablet  lamoTRIgine (LAMICTAL) 25 MG tablet  magnesium 250 MG tablet  topiramate (TOPAMAX) 25 MG tablet  ubrogepant (UBRELVY) 100 MG tablet  vitamin D3 (CHOLECALCIFEROL) 50 mcg (2000 units) tablet  vitamin E 400 units TABS  cyclobenzaprine (FLEXERIL) 10 MG tablet          Review of Systems   Constitutional:  Negative for fever.   Respiratory:  Negative for cough and shortness of breath.    Cardiovascular:  Negative for chest pain.   Gastrointestinal:  Negative for abdominal pain.   Skin:  Negative for rash.   All other systems reviewed and are negative.      Physical Exam   BP: 119/67  Pulse: 82  Temp: 98  F (36.7  C)  Resp: 17  SpO2: 96 %      Physical Exam  Constitutional:       General: She is not in acute distress.     Appearance: Normal appearance. She is not diaphoretic.   HENT:      Head: Atraumatic.      Mouth/Throat:      Mouth: Mucous membranes are moist.   Eyes:      General: No scleral icterus.     Conjunctiva/sclera: Conjunctivae normal.   Cardiovascular:      Rate and Rhythm: Normal rate.      Heart sounds: Normal heart sounds.   Pulmonary:      Effort: No respiratory distress.      Breath sounds: Normal breath sounds.   Abdominal:      General: Abdomen is flat.   Musculoskeletal:      Cervical back: Neck supple.      Right knee: Ecchymosis present. No swelling, deformity, effusion, bony tenderness or crepitus. Normal range of motion.  Tenderness present over the medial joint line.   Skin:     General: Skin is warm.      Findings: No rash.   Neurological:      Mental Status: She is alert.         ED Course        Procedures                  Results for orders placed or performed during the hospital encounter of 02/08/25 (from the past 24 hours)   XR Knee Right 3 Views    Narrative    EXAM: XR KNEE RIGHT 3 VIEWS  [facility]  DATE: 2/8/2025    INDICATION: Fall, pain, swelling  COMPARISON: None.      Impression    IMPRESSION: Normal joint spaces and alignment. No fracture or joint effusion.       Medications - No data to display    Assessments & Plan (with Medical Decision Making)   Knee contusion  Xray negative  D C home, follow-up with PCP  I have reviewed the nursing notes.    I have reviewed the findings, diagnosis, plan and need for follow up with the patient.          Discharge Medication List as of 2/8/2025 10:30 PM          Final diagnoses:   Contusion of right knee, initial encounter       2/8/2025   [facility]       Napoleon Zavala MD  02/09/25 0652

## 2025-02-12 ENCOUNTER — OFFICE VISIT (OUTPATIENT)
Dept: CHIROPRACTIC MEDICINE | Facility: OTHER | Age: 25
End: 2025-02-12
Attending: CHIROPRACTOR
Payer: COMMERCIAL

## 2025-02-12 ENCOUNTER — MYC REFILL (OUTPATIENT)
Dept: PSYCHIATRY | Facility: OTHER | Age: 25
End: 2025-02-12

## 2025-02-12 DIAGNOSIS — F31.81 BIPOLAR 2 DISORDER (H): ICD-10-CM

## 2025-02-12 DIAGNOSIS — M99.03 SEGMENTAL AND SOMATIC DYSFUNCTION OF LUMBAR REGION: ICD-10-CM

## 2025-02-12 DIAGNOSIS — M54.2 CERVICALGIA: ICD-10-CM

## 2025-02-12 DIAGNOSIS — M99.01 SEGMENTAL AND SOMATIC DYSFUNCTION OF CERVICAL REGION: Primary | ICD-10-CM

## 2025-02-12 DIAGNOSIS — M99.02 SEGMENTAL AND SOMATIC DYSFUNCTION OF THORACIC REGION: ICD-10-CM

## 2025-02-12 RX ORDER — LAMOTRIGINE 100 MG/1
TABLET ORAL
Qty: 32 TABLET | Refills: 1 | Status: SHIPPED | OUTPATIENT
Start: 2025-02-12 | End: 2025-03-12

## 2025-02-12 NOTE — TELEPHONE ENCOUNTER
Lamictal      Last Written Prescription Date:  1/17/25  Last Fill Quantity: 30,   # refills: 0  Last Office Visit: 1/17/25  Future Office visit:    Next 5 appointments (look out 90 days)      Feb 19, 2025 4:30 PM  (Arrive by 4:15 PM)  Provider Visit with Silva Self MD  [facility] [place] ([facility] [place] [facility] [address]  [place] [address]  [phone]     Feb 20, 2025 8:00 AM  (Arrive by 7:45 AM)  Office Visit with [facility]  [facility] [place] ([facility] [place] ) [address]  [place] [address]  [phone]     Feb 20, 2025 8:45 AM  (Arrive by 8:30 AM)  Return Visit with Allison Rand MD  [facility] [place] ([facility] [place] ) [address]  [place] [address]  [phone]     Apr 11, 2025 11:30 AM  (Arrive by 11:15 AM)  SHORT with Elyssa Townsend NP  [facility] [place] ([facility] [place] ) [address]  [place] [address]  [phone]             Routing refill request to provider for review/approval because:

## 2025-02-13 NOTE — PROGRESS NOTES
Subjective Finding:    Chief compalint: Patient presents with:  Back Pain: With neck pain   , Pain Scale: 4/10, Intensity: ache, Duration: 2 days, Radiating: no.    Date of injury:     Activities that the pain restricts:   Home/household/hobbies/social activities: Yes.  Work duties: No.  Sleep: No.  Makes symptoms better: laying down.  Makes symptoms worse: activity.  Have you seen anyone else for the symptoms? No.  Work related: No.  Automobile related injury: No.    Objective and Assessment:    Posture Analysis:   High shoulder: .  Head tilt: .  High iliac crest: .  Head carriage: neutral.  Thoracic Kyphosis: neutral.  Lumbar Lordosis: forward.    Lumbar Range of Motion: extension decreased.  Cervical Range of Motion: extension decreased.  Thoracic Range of Motion: .  Extremity Range of Motion: .    Palpation:   Quad lumb: bilateral, referred pain: no    Segmental dysfunction pre-treatment and treatment area: C6, T5, and L5.    Assessment post-treatment:  Cervical: ROM increased.  Thoracic: ROM increased.  Lumbar: ROM increased.    Comments: .      Complicating Factors: .    Procedure(s):  CMT:  78380 Chiropractic manipulative treatment 3-4 regions performed   Cervical: Diversified, See above for level, Supine, Thoracic: Diversified, See above for level, Prone, and Lumbar: Diversified, See above for level, Anterior dorsal    Modalities:  None performed this visit    Therapeutic procedures:  None    Plan:  Treatment plan: PRN.  Instructed patient: stretch as instructed at visit.  Short term goals: reduce pain.  Long term goals: increase ADL.  Prognosis: very good.

## 2025-02-18 ENCOUNTER — TELEPHONE (OUTPATIENT)
Dept: ALLERGY | Facility: OTHER | Age: 25
End: 2025-02-18

## 2025-02-18 DIAGNOSIS — T78.40XA ALLERGY, INITIAL ENCOUNTER: Primary | ICD-10-CM

## 2025-02-18 NOTE — PROGRESS NOTES
With history of perennial allergic rhinitis and prior testing about 6 years ago showing positivity for dog and dust, ragweed presents for follow-up.  Prior testing in outside facility and records were not available.  Distant immunotherapy for 3-4 months in [place].  There is a positive family history of allergies no new environmental exposures.  Sinuses clear and MRI brain dated 10/14/2024.  She also had concern for multiple food and nonallergic sensitivities at her visit with Lissett on 12/9.    Her main concern is possible dog allergy.  Dog at home, cat was in house now in foster home. She notes coughing, scratchy throat, mild swelling in throat, no wheezing, itchy eyes.  Similar symptoms with cats.  She also notes shortness of breath on exertion , was a runner and noted wheezing with exercise.    Benadryl and zyrtec and flonase have been helpful.      MRI brain dated 10/14/2024 shows clear frontal anterior and posterior ethmoid maxillary and sphenoid sinuses severe bilateral inferior and middle turbinate hypertrophy no occlusive adenoid tissue.    SNOT 49 with moderate sneezing severe cough severe anosmia severe sleep concerns    She notes chronic cough skin itching rash conjunctivitist     no history of asthma or aspirin allergy.       She occasionally vapes THC and develops wheezing.  No prior PFTs    She has significant history of migraines and Emgality prior authorization pending.  She takes Topamax in  and Ubrelvy for abortive measures.    Modified Quantitative Allergy Skin Testing results to 2/20/25 is negative    Sino-Nasal Outcome Test (SNOT - 22)    1. Need to Blow Nose: (Patient-Rptd) (P) Very mild  2. Nasal Blockage: (Patient-Rptd) (P) Very mild  3. Sneezing: (Patient-Rptd) (P) Moderate  4. Runny Nose: (Patient-Rptd) (P) Very mild  5. Cough: (Patient-Rptd) (P) Severe  6. Post-nasal discharge: (Patient-Rptd) (P) Moderate  7. Thick nasal discharge: (Patient-Rptd) (P) Very mild  8. Ear fullness:  (Patient-Rptd) (P) Very mild  9. Dizziness: (Patient-Rptd) (P) Moderate  10. Ear Pain: (Patient-Rptd) (P) Very mild  11. Facial pain/pressure: (Patient-Rptd) (P) Severe  12. Decreased Sense of Smell/Taste: (Patient-Rptd) (P) Severe  13. Difficulty falling asleep: (Patient-Rptd) (P) Severe  14. Wake up at night: (Patient-Rptd) (P) Severe  15. Lack of a good night's sleep: (Patient-Rptd) (P) Severe  16. Wake up tired: (Patient-Rptd) (P) Moderate  17. Fatigue: (Patient-Rptd) (P) Mild or slight  18. Reduced Productivity: (Patient-Rptd) (P) Very mild  19. Reduced Concentration: (Patient-Rptd) (P) Moderate  20. Frustrated/restless/irritable: (Patient-Rptd) (P) Very mild  21. Sad: (Patient-Rptd) (P) None  22. Embarrassed: (Patient-Rptd) (P) None    Total Score: (Patient-Rptd) (P) 49    COPYRIGHT 1996. [facility] [place] [facility],[place]      Otolaryngology Progress Note    Vangie Granados is a 24 year old female       Physical Exam  /68 (BP Location: Left arm, Patient Position: Sitting, Cuff Size: Adult Regular)   Pulse 79   Temp 98.2  F (36.8  C) (Tympanic)   Resp 16   SpO2 99%   General - The patient is well nourished and well developed, and appears to have good nutritional status.  Alert and oriented to person and place, interactive.  Head and Face - Normocephalic and atraumatic, with no gross asymmetry noted of the contour of the facial features.  The facial nerve is intact, with strong symmetric movements.  Neck-no palpable lymphadenopathy or thyroid mass.  Trachea is midline.  Eyes - Extraocular movements intact.   Ears- External auditory canals are patent, tympanic membranes are intact without effusion or worrisome retractions   Nose - Nasal mucosa is pink and moist with no abnormal mucus.  The septum was grossly midline and non-obstructive, turbinates of normal size and position.  No polyps, masses, or purulence noted on examination.  Mouth - Examination of the oral cavity shows pink, healthy,  moist mucosa.  No lesions or ulceration noted.  The dentition are in good repair.  The tongue is mobile and midline.  Throat - The walls of the oropharynx were smooth, pink, moist, symmetric, and had no lesions or ulcerations.  The tonsillar pillars and soft palate were symmetric.  The uvula was midline on elevation.  Grade 3 symmetric tonsils    Attempts at mirror laryngoscopy were not possible due to gag reflex.  Therefore I proceeded with a fiberoptic examination after informed consent.  First I applied topical nasal lidocaine and neosynephrine.  I then passed the scope through the nasal cavity. no purulence or polyposis  2+ inferior turbinate hypertrophy, slight septal deviation left    The nasopharynx was mucosally covered and symmetric.  Minimal normal appearing grade 1 adenoid tissue .  the eustachian tube openings were unobstructed.  Going further down I had a clear view of the base of tongue which had normal appearing grade 3 lingual tonsillar tissue.  The base of tongue was free of lesions, and the vallecula was open.  The epiglottis was smooth and mucosally covered.  The supraglottic larynx was then clearly visualized.  The vocal cords moved smoothly and symmetrically and were without mass or nodules.  Vocal cord mobility was normal bilaterally with phonation and respiration.  The pyriform sinuses were open and without elif mass or pooling of secretions, and clear upon valsalva.  The limited view of the subglottis was clear.   The patient tolerated the procedure well.      Impression/Plan  Vangie Granados is a 24 year old female    ICD-10-CM    1. Non-allergic rhinitis  J31.0 Inhalent Panel [place] ([employer])      2. DNS (deviated nasal septum)  J34.2       3. Nasal turbinate hypertrophy  J34.3       4. SOBOE (shortness of breath on exertion)  R06.02         -Normal vocal cords    -Negative allergy skin testing.  Prior brief SCIT in [place] for 3-4 months for dog, dust, ragweed (to her recollection, we  were never able to release the records despite attempts).    I discussed non-IgE mediated/non-allergic reactions.      PFTs through Dr. Self, I messaged her    Inhalent panel ordered go to lab to have that draw.  Nurse will call you with results     Continue as need Benadryl and Flonase    Follow up with ENT as needed     Follow up with Dr. Self for Pulmonary Function testing      Allison Rand D.O.  Otolaryngology/Head and Neck Surgery  Allergy

## 2025-02-18 NOTE — TELEPHONE ENCOUNTER
Patient coming in for MQT and needs a Claritin order. Order pended. Please sign if appropriate.   
WDL

## 2025-02-19 ENCOUNTER — OFFICE VISIT (OUTPATIENT)
Dept: FAMILY MEDICINE | Facility: OTHER | Age: 25
End: 2025-02-19
Attending: FAMILY MEDICINE
Payer: COMMERCIAL

## 2025-02-19 VITALS
HEART RATE: 72 BPM | WEIGHT: 152.4 LBS | TEMPERATURE: 98.5 F | DIASTOLIC BLOOD PRESSURE: 70 MMHG | SYSTOLIC BLOOD PRESSURE: 102 MMHG | BODY MASS INDEX: 26.02 KG/M2 | OXYGEN SATURATION: 99 % | RESPIRATION RATE: 17 BRPM | HEIGHT: 64 IN

## 2025-02-19 DIAGNOSIS — F31.81 BIPOLAR II DISORDER, MOST RECENT EPISODE HYPOMANIC (H): ICD-10-CM

## 2025-02-19 DIAGNOSIS — G43.E09 CHRONIC MIGRAINE WITH AURA WITHOUT STATUS MIGRAINOSUS, NOT INTRACTABLE: Primary | ICD-10-CM

## 2025-02-19 DIAGNOSIS — N89.8 VAGINAL DISCHARGE: ICD-10-CM

## 2025-02-19 DIAGNOSIS — E03.9 HYPOTHYROIDISM, UNSPECIFIED TYPE: ICD-10-CM

## 2025-02-19 DIAGNOSIS — F41.9 ANXIETY: ICD-10-CM

## 2025-02-19 DIAGNOSIS — S23.9XXD THORACIC BACK SPRAIN, SUBSEQUENT ENCOUNTER: ICD-10-CM

## 2025-02-19 LAB
BACTERIAL VAGINOSIS VAG-IMP: NEGATIVE
CANDIDA DNA VAG QL NAA+PROBE: NOT DETECTED
CANDIDA GLABRATA / CANDIDA KRUSEI DNA: NOT DETECTED
T VAGINALIS DNA VAG QL NAA+PROBE: NOT DETECTED
TSH SERPL DL<=0.005 MIU/L-ACNC: 1.89 UIU/ML (ref 0.3–4.2)

## 2025-02-19 PROCEDURE — G0463 HOSPITAL OUTPT CLINIC VISIT: HCPCS

## 2025-02-19 PROCEDURE — 36415 COLL VENOUS BLD VENIPUNCTURE: CPT | Mod: ZL | Performed by: FAMILY MEDICINE

## 2025-02-19 PROCEDURE — 84443 ASSAY THYROID STIM HORMONE: CPT | Mod: ZL | Performed by: FAMILY MEDICINE

## 2025-02-19 PROCEDURE — 81515 NFCT DS BV&VAGINITIS DNA ALG: CPT | Mod: ZL | Performed by: FAMILY MEDICINE

## 2025-02-19 RX ORDER — TOPIRAMATE 50 MG/1
50 TABLET, FILM COATED ORAL 2 TIMES DAILY
Qty: 180 TABLET | Refills: 0 | Status: SHIPPED | OUTPATIENT
Start: 2025-02-19

## 2025-02-19 RX ORDER — BUSPIRONE HYDROCHLORIDE 5 MG/1
5-10 TABLET ORAL 3 TIMES DAILY
Qty: 30 TABLET | Refills: 0 | Status: SHIPPED | OUTPATIENT
Start: 2025-02-19

## 2025-02-19 ASSESSMENT — PAIN SCALES - GENERAL: PAINLEVEL_OUTOF10: MODERATE PAIN (6)

## 2025-02-19 ASSESSMENT — ENCOUNTER SYMPTOMS
BACK PAIN: 1
HEADACHES: 1

## 2025-02-19 NOTE — PROGRESS NOTES
"  Assessment & Plan     Chronic migraine with aura without status migrainosus, not intractable  Not well controlled. Okay to increase to 50mg bid for headache ppx. Continue ubrelvy for prn use   - topiramate (TOPAMAX) 50 MG tablet  Dispense: 180 tablet; Refill: 0    Hypothyroidism, unspecified type  Not on medication, recent uptick in tachycardia, anxiety. Will update  - TSH with free T4 reflex    Anxiety  Start buspar. Discussed clonazepam as pt had researched this a bit.   - busPIRone (BUSPAR) 5 MG tablet  Dispense: 30 tablet; Refill: 0    Bipolar II disorder, most recent episode hypomanic (H)  Currently stable, high anxiety. Keep follow up with psychiatry 2/28    Thoracic back pain  Referred to PT. ? If TENS or dry needling may be helpful.     Vaginal discharge  BV in the ED in jan. Continued discoloration of discharge, repeat  - Multiplex Vaginal Panel by PCR      BMI  Estimated body mass index is 26.16 kg/m  as calculated from the following:    Height as of this encounter: 1.626 m (5' 4\").    Weight as of this encounter: 69.1 kg (152 lb 6.4 oz).     No follow-ups on file.    Subjective     Vangie is a 24 year old, presenting for the following health issues:  Headache and Back Pain        2/19/2025     4:16 PM   Additional Questions   Roomed by Chitra Jimenez   Accompanied by self     History of Present Illness       Back Pain:  She presents for follow up of back pain. Patient's back pain is a chronic problem.  Location of back pain:  Right lower back, left lower back, right middle of back, left middle of back, right upper back, left upper back, right side of neck and left side of neck  Description of back pain: burning, cramping, sharp, shooting and stabbing  Back pain spreads: nowhere    Since patient first noticed back pain, pain is: always present, but gets better and worse  Does back pain interfere with her job:  Yes       Reason for visit:  Back pain (ER visit, Chiro visits), increased heart rate, body " tremors (ER visit)  Symptom onset:  More than a month  Symptoms include:  As stated above  Symptom intensity:  Moderate  Symptom progression:  Staying the same  Had these symptoms before:  Yes  Has tried/received treatment for these symptoms:  Yes  Previous treatment was successful:  No  What makes it worse:  Increasing physical activity, stress  What makes it better:  Rest   She is taking medications regularly.       Migraine   Since your last clinic visit, how have your headaches changed?  Worsened  How often are you getting headaches or migraines? About three - four times a week longer and more severe  Are you able to do normal daily activities when you have a migraine? No  Are you taking rescue/relief medications? (Select all that apply) Other:   How helpful is your rescue/relief medication?  I get some relief  Are you taking any medications to prevent migraines? (Select all that apply)  Other: Ubrevely  In the past 4 weeks, how often have you gone to urgent care or the emergency room because of your headaches?  0      Chronic/Recurring Back Pain Follow Up    Where is your back pain located? (Select all that apply) Right lower back, left lower back, right middle of back, left middle of back, right upper back, left upper back, right side of neck and left side of neck  How would you describe your back pain?  burning, cramping, sharp, shooting, and stabbing  Where does your back pain spread? nowhere  Since your last clinic visit for back pain, how has your pain changed? always present, but gets better and worse  Does your back pain interfere with your job? YES  Since your last visit, have you tried any new treatment? No    Mid back, left between spine and shoulder blade.     Noting increase in HR, 99 average to 112 average. More sensitive to position changes, warm water, etc. Presyncope once. Note difficulty getting HR to decrease even if able to manage breathing, etc. Cold helps the HR. Started during a manic  "episode in later December. Able to get ECG on her watch.     Anxiety increased recently. Unclear trigger, but possibly med changes.     Seen by [facility] provider recently for psychiatry, plans to re establish.     Review of Systems  Constitutional, neuro, ENT, endocrine, pulmonary, cardiac, gastrointestinal, genitourinary, musculoskeletal, integument and psychiatric systems are negative, except as otherwise noted.      Objective    /70   Pulse 72   Temp 98.5  F (36.9  C) (Tympanic)   Resp 17   Ht 1.626 m (5' 4\")   Wt 69.1 kg (152 lb 6.4 oz)   SpO2 99%   BMI 26.16 kg/m    Body mass index is 26.16 kg/m .    Physical Exam   GENERAL: alert and no distress  EYES: Eyes grossly normal to inspection  NECK: no adenopathy, no asymmetry, masses, or scars  RESP: lungs clear to auscultation - no rales, rhonchi or wheezes  CV: regular rates and rhythm, normal S1 S2, no S3 or S4, no murmur, click or rub, and no peripheral edema  ABDOMEN: soft, nontender, no hepatosplenomegaly, no masses and bowel sounds normal  MS: no gross musculoskeletal defects noted, no edema  SKIN: no suspicious lesions or rashes  PSYCH: mentation appears normal, affect normal/bright    No results found for any visits on 02/19/25.        Signed Electronically by: Silva Self MD    The longitudinal plan of care for the diagnosis(es)/condition(s) as documented were addressed during this visit. Due to the added complexity in care, I will continue to support Vangie in the subsequent management and with ongoing continuity of care.  "

## 2025-02-20 ENCOUNTER — OFFICE VISIT (OUTPATIENT)
Dept: ALLERGY | Facility: OTHER | Age: 25
End: 2025-02-20
Attending: OTOLARYNGOLOGY
Payer: COMMERCIAL

## 2025-02-20 ENCOUNTER — MYC REFILL (OUTPATIENT)
Dept: FAMILY MEDICINE | Facility: OTHER | Age: 25
End: 2025-02-20

## 2025-02-20 ENCOUNTER — OFFICE VISIT (OUTPATIENT)
Dept: OTOLARYNGOLOGY | Facility: OTHER | Age: 25
End: 2025-02-20
Attending: OTOLARYNGOLOGY
Payer: COMMERCIAL

## 2025-02-20 ENCOUNTER — TELEPHONE (OUTPATIENT)
Dept: FAMILY MEDICINE | Facility: OTHER | Age: 25
End: 2025-02-20

## 2025-02-20 VITALS
DIASTOLIC BLOOD PRESSURE: 68 MMHG | HEART RATE: 79 BPM | OXYGEN SATURATION: 99 % | RESPIRATION RATE: 16 BRPM | SYSTOLIC BLOOD PRESSURE: 108 MMHG | TEMPERATURE: 98.2 F

## 2025-02-20 VITALS
OXYGEN SATURATION: 99 % | RESPIRATION RATE: 16 BRPM | TEMPERATURE: 98.2 F | HEART RATE: 79 BPM | SYSTOLIC BLOOD PRESSURE: 108 MMHG | DIASTOLIC BLOOD PRESSURE: 68 MMHG

## 2025-02-20 DIAGNOSIS — T78.40XA ALLERGY, INITIAL ENCOUNTER: Primary | ICD-10-CM

## 2025-02-20 DIAGNOSIS — J34.3 NASAL TURBINATE HYPERTROPHY: ICD-10-CM

## 2025-02-20 DIAGNOSIS — J34.2 DNS (DEVIATED NASAL SEPTUM): ICD-10-CM

## 2025-02-20 DIAGNOSIS — R06.02 SOBOE (SHORTNESS OF BREATH ON EXERTION): ICD-10-CM

## 2025-02-20 DIAGNOSIS — J31.0 NON-ALLERGIC RHINITIS: Primary | ICD-10-CM

## 2025-02-20 DIAGNOSIS — G43.E09 CHRONIC MIGRAINE WITH AURA WITHOUT STATUS MIGRAINOSUS, NOT INTRACTABLE: ICD-10-CM

## 2025-02-20 DIAGNOSIS — R05.9 COUGH, UNSPECIFIED TYPE: Primary | ICD-10-CM

## 2025-02-20 PROCEDURE — 31575 DIAGNOSTIC LARYNGOSCOPY: CPT | Performed by: OTOLARYNGOLOGY

## 2025-02-20 PROCEDURE — 86003 ALLG SPEC IGE CRUDE XTRC EA: CPT | Mod: ZL | Performed by: OTOLARYNGOLOGY

## 2025-02-20 PROCEDURE — 95004 PERQ TESTS W/ALRGNC XTRCS: CPT

## 2025-02-20 PROCEDURE — G0463 HOSPITAL OUTPT CLINIC VISIT: HCPCS | Mod: 25

## 2025-02-20 PROCEDURE — 36415 COLL VENOUS BLD VENIPUNCTURE: CPT | Mod: ZL | Performed by: OTOLARYNGOLOGY

## 2025-02-20 RX ORDER — UBROGEPANT 100 MG/1
TABLET ORAL
Qty: 16 TABLET | Refills: 0 | OUTPATIENT
Start: 2025-02-20

## 2025-02-20 ASSESSMENT — PAIN SCALES - GENERAL
PAINLEVEL_OUTOF10: NO PAIN (0)
PAINLEVEL_OUTOF10: NO PAIN (0)

## 2025-02-20 NOTE — LETTER
2/20/2025      Vangie Granados  [address] [place] [facility] [address]      Dear Colleague,    Thank you for referring your patient, Vangie Granados, to the [facility]. Please see a copy of my visit note below.    With history of perennial allergic rhinitis and prior testing about 6 years ago showing positivity for dog and dust, ragweed presents for follow-up.  Prior testing in outside facility and records were not available.  Distant immunotherapy for 3-4 months in [place].  There is a positive family history of allergies no new environmental exposures.  Sinuses clear and MRI brain dated 10/14/2024.  She also had concern for multiple food and nonallergic sensitivities at her visit with [facility] on 12/9.    Her main concern is possible dog allergy.  Dog at home, cat was in house now in foster home. She notes coughing, scratchy throat, mild swelling in throat, no wheezing, itchy eyes.  Similar symptoms with cats.  She also notes shortness of breath on exertion , was a runner and noted wheezing with exercise.    Benadryl and zyrtec and flonase have been helpful.      MRI brain dated 10/14/2024 shows clear frontal anterior and posterior ethmoid maxillary and sphenoid sinuses severe bilateral inferior and middle turbinate hypertrophy no occlusive adenoid tissue.    SNOT 49 with moderate sneezing severe cough severe anosmia severe sleep concerns    She notes chronic cough skin itching rash conjunctivitist     no history of asthma or aspirin allergy.       She occasionally vapes THC and develops wheezing.  No prior PFTs    She has significant history of migraines and Emgality prior authorization pending.  She takes Topamax in  and Ubrelvy for abortive measures.    Modified Quantitative Allergy Skin Testing results to 2/20/25 is negative    Sino-Nasal Outcome Test (SNOT - 22)    1. Need to Blow Nose: (Patient-Rptd) (P) Very mild  2. Nasal Blockage: (Patient-Rptd) (P) Very mild  3. Sneezing:  (Patient-Rptd) (P) Moderate  4. Runny Nose: (Patient-Rptd) (P) Very mild  5. Cough: (Patient-Rptd) (P) Severe  6. Post-nasal discharge: (Patient-Rptd) (P) Moderate  7. Thick nasal discharge: (Patient-Rptd) (P) Very mild  8. Ear fullness: (Patient-Rptd) (P) Very mild  9. Dizziness: (Patient-Rptd) (P) Moderate  10. Ear Pain: (Patient-Rptd) (P) Very mild  11. Facial pain/pressure: (Patient-Rptd) (P) Severe  12. Decreased Sense of Smell/Taste: (Patient-Rptd) (P) Severe  13. Difficulty falling asleep: (Patient-Rptd) (P) Severe  14. Wake up at night: (Patient-Rptd) (P) Severe  15. Lack of a good night's sleep: (Patient-Rptd) (P) Severe  16. Wake up tired: (Patient-Rptd) (P) Moderate  17. Fatigue: (Patient-Rptd) (P) Mild or slight  18. Reduced Productivity: (Patient-Rptd) (P) Very mild  19. Reduced Concentration: (Patient-Rptd) (P) Moderate  20. Frustrated/restless/irritable: (Patient-Rptd) (P) Very mild  21. Sad: (Patient-Rptd) (P) None  22. Embarrassed: (Patient-Rptd) (P) None    Total Score: (Patient-Rptd) (P) 49    COPYRIGHT 1996. [facility] [place] . [facility],[place]      Otolaryngology Progress Note    Vangie Granados is a 24 year old female       Physical Exam  /68 (BP Location: Left arm, Patient Position: Sitting, Cuff Size: Adult Regular)   Pulse 79   Temp 98.2  F (36.8  C) (Tympanic)   Resp 16   SpO2 99%   General - The patient is well nourished and well developed, and appears to have good nutritional status.  Alert and oriented to person and place, interactive.  Head and Face - Normocephalic and atraumatic, with no gross asymmetry noted of the contour of the facial features.  The facial nerve is intact, with strong symmetric movements.  Neck-no palpable lymphadenopathy or thyroid mass.  Trachea is midline.  Eyes - Extraocular movements intact.   Ears- External auditory canals are patent, tympanic membranes are intact without effusion or worrisome retractions   Nose - Nasal mucosa is pink and  moist with no abnormal mucus.  The septum was grossly midline and non-obstructive, turbinates of normal size and position.  No polyps, masses, or purulence noted on examination.  Mouth - Examination of the oral cavity shows pink, healthy, moist mucosa.  No lesions or ulceration noted.  The dentition are in good repair.  The tongue is mobile and midline.  Throat - The walls of the oropharynx were smooth, pink, moist, symmetric, and had no lesions or ulcerations.  The tonsillar pillars and soft palate were symmetric.  The uvula was midline on elevation.  Grade 3 symmetric tonsils    Attempts at mirror laryngoscopy were not possible due to gag reflex.  Therefore I proceeded with a fiberoptic examination after informed consent.  First I applied topical nasal lidocaine and neosynephrine.  I then passed the scope through the nasal cavity. no purulence or polyposis  2+ inferior turbinate hypertrophy, slight septal deviation left    The nasopharynx was mucosally covered and symmetric.  Minimal normal appearing grade 1 adenoid tissue .  the eustachian tube openings were unobstructed.  Going further down I had a clear view of the base of tongue which had normal appearing grade 3 lingual tonsillar tissue.  The base of tongue was free of lesions, and the vallecula was open.  The epiglottis was smooth and mucosally covered.  The supraglottic larynx was then clearly visualized.  The vocal cords moved smoothly and symmetrically and were without mass or nodules.  Vocal cord mobility was normal bilaterally with phonation and respiration.  The pyriform sinuses were open and without elif mass or pooling of secretions, and clear upon valsalva.  The limited view of the subglottis was clear.   The patient tolerated the procedure well.      Impression/Plan  Vangie Granados is a 24 year old female    ICD-10-CM    1. Non-allergic rhinitis  J31.0 Inhalent Panel [place] [facility] ([facility])      2. DNS (deviated nasal septum)  J34.2       3. Nasal  turbinate hypertrophy  J34.3       4. SOBOE (shortness of breath on exertion)  R06.02         -Normal vocal cords    -Negative allergy skin testing.  Prior brief SCIT in [place] for 3-4 months for dog, dust, ragweed (to her recollection, we were never able to release the records despite attempts).    I discussed non-IgE mediated/non-allergic reactions.      PFTs through Dr. Self, I messaged her    Inhalent panel ordered go to lab to have that draw.  Nurse will call you with results     Continue as need Benadryl and Flonase    Follow up with ENT as needed     Follow up with Dr. Self for Pulmonary Function testing      Allison Rand D.O.  Otolaryngology/Head and Neck Surgery  Allergy                Again, thank you for allowing me to participate in the care of your patient.        Sincerely,        Allison Rand MD    Electronically signed

## 2025-02-20 NOTE — TELEPHONE ENCOUNTER
Ubrelvy      Last Written Prescription Date:  2/20/25  Last Fill Quantity: 16,   # refills: 1  Last Office Visit: 2/19/25  Future Office visit:    Next 5 appointments (look out 90 days)      Mar 26, 2025 11:30 AM  (Arrive by 11:15 AM)  Provider Visit with Silva Self MD  [facility] [place] ([facility] [place] ) [address]  [place] [address]  [phone]     Apr 11, 2025 11:30 AM  (Arrive by 11:15 AM)  SHORT with Elyssa Townsend NP  [facility] [place] ([facility] [place] ) [address] [place] [address]  [phone]             Routing refill request to provider for review/approval because:

## 2025-02-20 NOTE — TELEPHONE ENCOUNTER
ubrogepant (UBRELVY) 100 MG tablet       Last Written Prescription Date:  12/06/2024  Last Fill Quantity: 16,   # refills: 1  Last Office Visit: 02/19/2025  Future Office visit:    Next 5 appointments (look out 90 days)      Feb 20, 2025 8:45 AM  (Arrive by 8:30 AM)  Return Visit with Allison Rand MD  [facility] [place] ([facility] [place] ) [address]  [place] [address]  [phone]     Mar 26, 2025 11:30 AM  (Arrive by 11:15 AM)  Provider Visit with Silva Self MD  [facility] [place] ([facility] [place] ) [address]  [place] [address]  [phone]     Apr 11, 2025 11:30 AM  (Arrive by 11:15 AM)  SHORT with Elyssa Townsend NP  [facility] [place] ([facility] [place] ) [address]  [place] [address]  [phone]             Routing refill request to provider for review/approval because:      Kimberly Boecker, RN

## 2025-02-20 NOTE — TELEPHONE ENCOUNTER
----- Message from Allison Rand sent at 2/20/2025  9:35 AM CST -----  Regarding: pfts  Lee Ascencio  Could you please order PFTs and see Vangie back in next few months to exclude asthma?  Normal vocal cords  Allergy testing negative  No prior PFTs or diagnosis  Thank you!

## 2025-02-20 NOTE — PATIENT INSTRUCTIONS
Inhalent panel ordered go to lab to have that draw.  Nurse will call you with results     Continue as need Benadryl and Flonase    Follow up with ENT as needed     Follow up with Dr. Self for Pulmonary Function testing      Thank you for allowing Dr. Rand and our ENT team to participate in your care.  If your medications are too expensive, please give the nurse a call.  We can possibly change this medication.  If you have a scheduling or an appointment question please contact our Health Unit Coordinator at their direct line [phone].   ALL nursing questions or concerns can be directed to your ENT nurse, Be, at: [phone]

## 2025-02-20 NOTE — PROGRESS NOTES
This patient presents today for allergy skin testing.      Symptoms have included eye irritation, trouble breathing, coughing, congestion, swollen eyes and are worse in spring and fall seasons.     This patient lives in a single family home, with a  basement.  This patient does not suspect mold, water or moisture issues in the home.  There is carpet in the home, and is in her bedroom.  Home has propane heat and does have air conditioning.        This patient has a dog for pets.  They are inside.    This patient has had allergy testing in the past.    This patient's medications have been reviewed prior to testing and all appropriate medications have been stopped.    Consent is signed by patient and signature is verified.     MQT/ID test is performed per protocol.  The patient tolerated testing well.  All findings are recorded on the paper flow sheet. Results are reviewed with this patient.  They are given written information regarding allergy.       The patient will follow-up with Dr. Rand for treatment plan.      Radha Starr RN

## 2025-02-28 ENCOUNTER — VIRTUAL VISIT (OUTPATIENT)
Dept: PSYCHIATRY | Facility: OTHER | Age: 25
End: 2025-02-28
Payer: COMMERCIAL

## 2025-02-28 DIAGNOSIS — F41.9 ANXIETY: ICD-10-CM

## 2025-02-28 RX ORDER — BUSPIRONE HYDROCHLORIDE 5 MG/1
5 TABLET ORAL 3 TIMES DAILY
Qty: 90 TABLET | Refills: 1 | Status: SHIPPED | OUTPATIENT
Start: 2025-02-28

## 2025-02-28 ASSESSMENT — ANXIETY QUESTIONNAIRES
3. WORRYING TOO MUCH ABOUT DIFFERENT THINGS: SEVERAL DAYS
7. FEELING AFRAID AS IF SOMETHING AWFUL MIGHT HAPPEN: SEVERAL DAYS
GAD7 TOTAL SCORE: 11
GAD7 TOTAL SCORE: 11
2. NOT BEING ABLE TO STOP OR CONTROL WORRYING: SEVERAL DAYS
6. BECOMING EASILY ANNOYED OR IRRITABLE: NEARLY EVERY DAY
1. FEELING NERVOUS, ANXIOUS, OR ON EDGE: SEVERAL DAYS
5. BEING SO RESTLESS THAT IT IS HARD TO SIT STILL: NEARLY EVERY DAY

## 2025-02-28 ASSESSMENT — PAIN SCALES - GENERAL: PAINLEVEL_OUTOF10: MODERATE PAIN (6)

## 2025-02-28 ASSESSMENT — PATIENT HEALTH QUESTIONNAIRE - PHQ9
SUM OF ALL RESPONSES TO PHQ QUESTIONS 1-9: 15
5. POOR APPETITE OR OVEREATING: SEVERAL DAYS

## 2025-02-28 NOTE — PROGRESS NOTES
[facility]  OUTPATIENT PSYCHIATRY PROGRESS NOTE     VIRTUAL VISIT     Telemedicine Visit: The patient's condition can be safely assessed and treated via synchronous audio and visual telemedicine encounter.      Reason for Telemedicine Visit: Patient has requested telehealth visit    Originating Site (Patient location): Patient's home    Distant Location (Provider location):  On-site    Consent:  The patient/guardian has verbally consented to: the potential risks and benefits of telemedicine (video visit) versus in person care; bill my insurance or make self-payment for services provided; and responsibility for payment of non-covered services.     Mode of Communication:  Telephone    Date of Service:  02/28/2025    Start Time: 2:05 pm  End Time: ***       ASSESSMENT & PLAN     Initial Psychiatry Visit Date: 10/9/24    This is a 24 year old female who presents for ongoing psychiatric care and medication management for the following:       Diagnoses Medications/Comments   1.         2.           Medication:   ***    Therapy: Plans to start with [facility]  She is also looking for online psychiatrists specifically for bipolar. She was hoping to establish with [facility], but he can't get her in soon enough for follow ups.    Referrals: None    Labs: None    Follow Up: She will be establishing with a medication provider at [facility] at the end    Treatment Risk Statement: The risks, benefits, alternatives and potential adverse effects have been discussed and are understood by the patient. The patient agrees to the treatment plan with the ability to do so. The patient understands to call 911 or call/text the Crisis Line at 988 or report directly to the nearest Emergency Department if urgent or life threatening symptoms present.        HISTORY OF PRESENT ILLNESS     Vangie was last seen in [facility] on 1/17/25.  At that time:    ***     Today:    Vangie presents for ongoing medication management  and psychiatric care.   Lamictal - has a little bit of red spots left, they are going away, not spreading, a lot better than yesterday. She took benadryl yesterday. She did not get any other symptoms. In general with the Lamictal it has been doing a very good job with mood stabilization, the depression and markus is a lot better. Able to be a lot more production. Able to get a lot more stuff done, not feeling as immobilized with as intense emotions. Will be starting 150 mg daily today.  She has been having weird dreams - has always had nightmares. They are more frequent, but as she gets used to the medication doses the dream frequency have decreased, stress level also increases her nightmares.   Buspar - she thinks has been helping to manage the anxiety symptoms. She would like to continue this. Has not had any side effects.  Mood: ***  Sleep (Insomnia or hypersomnia nearly every day) -  Interest (markedly diminished interest or pleasure in nearly all activities most of the time) -  Guilt (excessive or inappropriate feelings of guilt or worthlessness most of the time) -  Energy (loss of energy or fatigue most of the time) -   Concentration (diminished ability to think or concentrate; indecisiveness most of the time) -   Appetite (increase or decrease in appetite) -   Psychomotor (observed psychomotor agitation/retardation) -  Suicide (recurrent thoughts of death/suicidal thoughts) -   Anxiety: ***. Endorses {ANXIETY SYMPTOMS:993277}.  Panic Attacks: *** panic attacks. Symptoms include {PANICATTACKtp:968828}.  Substance Use: No change. Current use includes: ***       REVIEW OF SYSTEMS     Allergies: [place], Lactose, Serotonin reuptake inhibitors (ssris), and Sertraline     Vital Signs: There were no vitals taken for this visit.             Weight:   Wt Readings from Last 4 Encounters:   02/19/25 69.1 kg (152 lb 6.4 oz)   01/24/25 72.1 kg (159 lb)   01/14/25 72.8 kg (160 lb 9.6 oz)   01/12/25 72.1 kg (159 lb)         BMI: There is no height or weight on file to calculate BMI.    Medical diagnoses:   Past Medical History:   Diagnosis Date    Depressive disorder 2020    First medical diagnosis, had symptoms from age 12    Hypothyroidism 12/7/2022      Physical Exam: Please refer to physical exam completed by primary care provider.    Review of systems is negative unless noted above.       MEDICATIONS                                                                                                                                                                 BOLD psych meds     I have reviewed this patient's current medications.    Current Outpatient Medications   Medication Sig Dispense Refill    acetaminophen 500 MG CAPS Take 500 mg by mouth.      busPIRone (BUSPAR) 5 MG tablet Take 1-2 tablets (5-10 mg) by mouth 3 times daily. 30 tablet 0    cyanocobalamin (VITAMIN B-12) 1000 MCG tablet Take 1 tablet by mouth daily at 2 pm.      dicyclomine (BENTYL) 10 MG capsule Take 10 mg by mouth.      diphenhydrAMINE (BENADRYL) 25 MG tablet Take 25 mg by mouth nightly as needed for itching or allergies.      fluticasone (FLONASE) 50 MCG/ACT nasal spray Spray 2 sprays in nostril daily.      lamoTRIgine (LAMICTAL) 200 MG tablet Take 1 tablet (200 mg) by mouth daily. (Patient taking differently: Take 100 mg by mouth daily.) 30 tablet 2    magnesium 250 MG tablet Take 1 tablet by mouth daily.      topiramate (TOPAMAX) 50 MG tablet Take 1 tablet (50 mg) by mouth 2 times daily. 180 tablet 0    ubrogepant (UBRELVY) 100 MG tablet Take 1 tablet (100 mg) by mouth at onset of headache (Take one tablet by mouth at onset of headache). 16 tablet 1    vitamin D3 (CHOLECALCIFEROL) 50 mcg (2000 units) tablet Take 1 tablet by mouth as needed.      vitamin E 400 units TABS Take 400 Units by mouth as needed.       Current Facility-Administered Medications   Medication Dose Route Frequency Provider Last Rate Last Admin    lidocaine 1 % 3 mL  3 mL Subcutaneous  Once Elyssa Townsend NP        medroxyPROGESTERone (DEPO-PROVERA) injection 150 mg  150 mg Intramuscular Q90 Days Elyssa Townsend NP   150 mg at 01/24/25 1139       PDMP Review         Value Time User    State PDMP site checked  Yes 10/15/2024 12:20 PM Lori Flores APRN CNP            Reviewed PDMP: ***        MENTAL STATUS EXAM / PHQ-9 AND OSCAR-7 / SUICIDE RISK ASSESSMENT     {PSYCHMINIMENTALSTATUS:921432}    Reviewed PHQ-9 and OSCAR-7 screenings        1/7/2025    11:08 AM 1/16/2025     1:36 PM 2/28/2025     1:51 PM   PHQ   PHQ-9 Total Score 18  24  15   Q9: Thoughts of better off dead/self-harm past 2 weeks More than half the days More than half the days Not at all   F/U: Thoughts of suicide or self-harm Yes Yes    F/U: Self harm-plan Yes No    F/U: Self-harm action Yes No    F/U: Safety concerns No No        Patient-reported          11/8/2024     1:02 PM 1/16/2025     1:37 PM 2/28/2025     1:51 PM   OSCAR-7 SCORE   Total Score 17 (severe anxiety) 21 (severe anxiety)    Total Score 17  21  11       Patient-reported       Suicide Risk Assessment:  Suicidal Ideation: Denies suicidal ideation or self-harm  Homicidal Ideation: Denies homicidal ideation       ATTESTATION      Lori Flores DNP, APRN, PMHNP-BC    As the provider I attest to compliance with applicable laws and regulations related to telemedicine.    *** minutes spent on the date of the encounter doing {2021 E&M time in:346977}. Provided supportive psychotherapy, including psychoeducation about symptoms, prognosis, empathetic listening, encouragement, and cognitive reframing interventions targeting patient's perceptions of symptoms and psychosocial impacts with goal of promoting active pursuit of treatment options.    The longitudinal plan of care for the diagnosis(es)/condition(s) as documented were addressed during this visit. Due to the added complexity in care, I will continue to support Vangie in the subsequent management and with ongoing continuity of  care.

## 2025-03-03 ENCOUNTER — TELEPHONE (OUTPATIENT)
Dept: PSYCHIATRY | Facility: OTHER | Age: 25
End: 2025-03-03

## 2025-03-03 NOTE — TELEPHONE ENCOUNTER
Patient leaves a message with the increased dose of Lamictal she started --2-25-25,  she increased dose from 150mg to 200mg and started getting a rash on her feet and then noticed rash on her hands. She did not take the medication last night  Please advise

## 2025-03-03 NOTE — TELEPHONE ENCOUNTER
Notified patient and she states understanding in stopping the Lamictal, she will keep documentation of previous doses taken and describe rash while stopping, will contact clinic in 1-2 days with any progression of symptoms if any

## 2025-03-11 LAB — SCANNED LAB RESULT: NORMAL

## (undated) RX ORDER — LIDOCAINE HYDROCHLORIDE AND EPINEPHRINE 10; 10 MG/ML; UG/ML
INJECTION, SOLUTION INFILTRATION; PERINEURAL
Status: DISPENSED
Start: 2024-09-20